# Patient Record
Sex: MALE | Race: WHITE | HISPANIC OR LATINO | URBAN - METROPOLITAN AREA
[De-identification: names, ages, dates, MRNs, and addresses within clinical notes are randomized per-mention and may not be internally consistent; named-entity substitution may affect disease eponyms.]

---

## 2017-06-23 ENCOUNTER — TRANSFERRED RECORDS (OUTPATIENT)
Dept: HEALTH INFORMATION MANAGEMENT | Facility: CLINIC | Age: 32
End: 2017-06-23

## 2018-06-06 DIAGNOSIS — C92.10 CML (CHRONIC MYELOCYTIC LEUKEMIA) (H): Primary | ICD-10-CM

## 2018-06-07 ENCOUNTER — APPOINTMENT (OUTPATIENT)
Dept: LAB | Facility: CLINIC | Age: 33
End: 2018-06-07
Attending: INTERNAL MEDICINE
Payer: COMMERCIAL

## 2018-06-07 ENCOUNTER — OFFICE VISIT (OUTPATIENT)
Dept: TRANSPLANT | Facility: CLINIC | Age: 33
End: 2018-06-07
Attending: INTERNAL MEDICINE
Payer: COMMERCIAL

## 2018-06-07 VITALS
TEMPERATURE: 98.3 F | RESPIRATION RATE: 16 BRPM | SYSTOLIC BLOOD PRESSURE: 130 MMHG | HEIGHT: 68 IN | WEIGHT: 175.2 LBS | BODY MASS INDEX: 26.55 KG/M2 | HEART RATE: 86 BPM | DIASTOLIC BLOOD PRESSURE: 79 MMHG | OXYGEN SATURATION: 96 %

## 2018-06-07 DIAGNOSIS — C92.10 CML (CHRONIC MYELOCYTIC LEUKEMIA) (H): ICD-10-CM

## 2018-06-07 DIAGNOSIS — C92.10 CML (CHRONIC MYELOCYTIC LEUKEMIA) (H): Primary | ICD-10-CM

## 2018-06-07 LAB
ALBUMIN SERPL-MCNC: 4.2 G/DL (ref 3.4–5)
ALP SERPL-CCNC: 76 U/L (ref 40–150)
ALT SERPL W P-5'-P-CCNC: 29 U/L (ref 0–70)
ANION GAP SERPL CALCULATED.3IONS-SCNC: 6 MMOL/L (ref 3–14)
AST SERPL W P-5'-P-CCNC: 26 U/L (ref 0–45)
BASOPHILS # BLD AUTO: 0 10E9/L (ref 0–0.2)
BASOPHILS NFR BLD AUTO: 0.3 %
BILIRUB SERPL-MCNC: 0.5 MG/DL (ref 0.2–1.3)
BUN SERPL-MCNC: 15 MG/DL (ref 7–30)
CALCIUM SERPL-MCNC: 8.6 MG/DL (ref 8.5–10.1)
CHLORIDE SERPL-SCNC: 105 MMOL/L (ref 94–109)
CO2 SERPL-SCNC: 28 MMOL/L (ref 20–32)
CREAT SERPL-MCNC: 0.9 MG/DL (ref 0.66–1.25)
DIFFERENTIAL METHOD BLD: ABNORMAL
EOSINOPHIL # BLD AUTO: 0 10E9/L (ref 0–0.7)
EOSINOPHIL NFR BLD AUTO: 0.7 %
ERYTHROCYTE [DISTWIDTH] IN BLOOD BY AUTOMATED COUNT: 12.4 % (ref 10–15)
GFR SERPL CREATININE-BSD FRML MDRD: >90 ML/MIN/1.7M2
GLUCOSE SERPL-MCNC: 98 MG/DL (ref 70–99)
HCT VFR BLD AUTO: 39.9 % (ref 40–53)
HGB BLD-MCNC: 14.1 G/DL (ref 13.3–17.7)
IMM GRANULOCYTES # BLD: 0 10E9/L (ref 0–0.4)
IMM GRANULOCYTES NFR BLD: 0.2 %
LYMPHOCYTES # BLD AUTO: 2.7 10E9/L (ref 0.8–5.3)
LYMPHOCYTES NFR BLD AUTO: 46.1 %
MCH RBC QN AUTO: 32.7 PG (ref 26.5–33)
MCHC RBC AUTO-ENTMCNC: 35.3 G/DL (ref 31.5–36.5)
MCV RBC AUTO: 93 FL (ref 78–100)
MONOCYTES # BLD AUTO: 0.5 10E9/L (ref 0–1.3)
MONOCYTES NFR BLD AUTO: 7.7 %
NEUTROPHILS # BLD AUTO: 2.7 10E9/L (ref 1.6–8.3)
NEUTROPHILS NFR BLD AUTO: 45 %
NRBC # BLD AUTO: 0 10*3/UL
NRBC BLD AUTO-RTO: 0 /100
PLATELET # BLD AUTO: 174 10E9/L (ref 150–450)
POTASSIUM SERPL-SCNC: 4.1 MMOL/L (ref 3.4–5.3)
PROT SERPL-MCNC: 7.1 G/DL (ref 6.8–8.8)
RBC # BLD AUTO: 4.31 10E12/L (ref 4.4–5.9)
SODIUM SERPL-SCNC: 138 MMOL/L (ref 133–144)
WBC # BLD AUTO: 5.9 10E9/L (ref 4–11)

## 2018-06-07 PROCEDURE — G0463 HOSPITAL OUTPT CLINIC VISIT: HCPCS

## 2018-06-07 PROCEDURE — 85025 COMPLETE CBC W/AUTO DIFF WBC: CPT | Performed by: INTERNAL MEDICINE

## 2018-06-07 PROCEDURE — 81207 BCR/ABL1 GENE MINOR BP: CPT | Performed by: INTERNAL MEDICINE

## 2018-06-07 PROCEDURE — 36415 COLL VENOUS BLD VENIPUNCTURE: CPT

## 2018-06-07 PROCEDURE — 80053 COMPREHEN METABOLIC PANEL: CPT | Performed by: INTERNAL MEDICINE

## 2018-06-07 PROCEDURE — 81206 BCR/ABL1 GENE MAJOR BP: CPT | Performed by: INTERNAL MEDICINE

## 2018-06-07 RX ORDER — IMATINIB MESYLATE 400 MG/1
400 TABLET, FILM COATED ORAL DAILY
Qty: 30 TABLET | Refills: 0 | Status: SHIPPED | OUTPATIENT
Start: 2018-06-07 | End: 2019-02-18

## 2018-06-07 ASSESSMENT — PAIN SCALES - GENERAL: PAINLEVEL: NO PAIN (0)

## 2018-06-07 NOTE — MR AVS SNAPSHOT
After Visit Summary   6/7/2018    Easton Pardo    MRN: [id number]           Patient Information     Date Of Birth          1985        Visit Information        Provider Department      6/7/2018 2:00 PM Sonia Bermudez MD [facility] Blood and Marrow Transplant        Today's Diagnoses     CML (chronic myelocytic leukemia) (H)              [facility] ([facility])  [address] [place] [address]  Phone: [phone]  Clinic Hours:   Monday-Thursday:7am to 7pm   Friday: 7am to 5pm   Weekends and holidays:    8am to noon (in general)  If your fever is 100.5  or greater,   call the clinic.  After hours call the   hospital at [phone] or   [phone]. Ask for the BMT   fellow on-call           Care Instructions    elizabeth onc in 6 months with labs prior          Follow-ups after your visit        Your next 10 appointments already scheduled     Dec 06, 2018  3:00 PM CST   [facility] Lab Draw with  [facility] DRAW   [facility] Lab Draw ([facility])    [address] [place] [address]  [address] [place] [id number] [phone]            Dec 06, 2018  3:30 PM CST   RETURN ONC with Sonia Bermudez MD   [facility] Blood and Marrow Transplant ([facility])    [address] [place] [address]  [address] [place] [id number] [phone]              Who to contact     If you have questions or need follow up information about today's clinic visit or your schedule please contact [facility] BLOOD AND MARROW TRANSPLANT directly at [phone].  Normal or non-critical lab and imaging results will be communicated to you by MyChart, letter or phone within 4 business days after the clinic has received the results. If you do not hear from us within 7 days, please contact the clinic through MyChart or phone. If you have a critical or abnormal lab result, we will notify you by phone as soon as possible.  Submit refill requests  "through [employer] or call your pharmacy and they will forward the refill request to us. Please allow 3 business days for your refill to be completed.          Additional Information About Your Visit        [employer] Information     [employer] lets you send messages to your doctor, view your test results, renew your prescriptions, schedule appointments and more. To sign up, go to [email or web address] . Click on \"Log in\" on the left side of the screen, which will take you to the Welcome page. Then click on \"Sign up Now\" on the right side of the page.     You will be asked to enter the access code listed below, as well as some personal information. Please follow the directions to create your username and password.     Your access code is: [email or web address]  Expires: 2018  1:21 PM     Your access code will  in 90 days. If you need help or a new code, please call your [place] clinic or [phone].        Care EveryWhere ID     This is your Care EveryWhere ID. This could be used by other organizations to access your [place] medical records  [id number]        Your Vitals Were     Pulse Temperature Respirations Height Pulse Oximetry BMI (Body Mass Index)    86 98.3  F (36.8  C) (Oral) 16 1.73 m (5' 8.11\") 96% 26.55 kg/m2       Blood Pressure from Last 3 Encounters:   18 130/79    Weight from Last 3 Encounters:   18 79.5 kg (175 lb 3.2 oz)              We Performed the Following     BCR ABL1 Major Breakpoint Quant p210     BCR ABL1 Minor Breakpoint Quant p190     CBC with platelets differential     Comprehensive metabolic panel        Recent Review Flowsheet Data     BMT Recent Results Latest Ref Rng & Units 2018    WBC 4.0 - 11.0 10e9/L 5.9    Hemoglobin 13.3 - 17.7 g/dL 14.1    Platelet Count 150 - 450 10e9/L 174    Neutrophils (Absolute) 1.6 - 8.3 10e9/L 2.7    Sodium 133 - 144 mmol/L 138    Potassium 3.4 - 5.3 mmol/L 4.1    Chloride 94 - 109 mmol/L 105    Glucose 70 - 99 mg/dL 98    Urea Nitrogen 7 " - 30 mg/dL 15    Creatinine 0.66 - 1.25 mg/dL 0.90    Calcium (Total) 8.5 - 10.1 mg/dL 8.6    Protein (Total) 6.8 - 8.8 g/dL 7.1    Albumin 3.4 - 5.0 g/dL 4.2    Alkaline Phosphatase 40 - 150 U/L 76    AST 0 - 45 U/L 26    ALT 0 - 70 U/L 29    MCV 78 - 100 fl 93               Primary Care Provider    None Specified       No primary provider on file.        Equal Access to Services     NICCI RODRIGUEZ : Hadii aad ku hadasho Soomaali, waaxda luqadaha, qaybta kaalmada adeegyada, waxzeeshan reed . So [facility] [phone].    ATENCIÓN: Si habla español, tiene a lopez disposición servicios gratuitos de asistencia lingüística. Llame al [phone].    We comply with applicable federal civil rights laws and [place] laws. We do not discriminate on the basis of race, color, national origin, age, disability, sex, sexual orientation, or gender identity.            Thank you!     Thank you for choosing [facility] BLOOD AND MARROW TRANSPLANT  for your care. Our goal is always to provide you with excellent care. Hearing back from our patients is one way we can continue to improve our services. Please take a few minutes to complete the written survey that you may receive in the mail after your visit with us. Thank you!             Your Updated Medication List - Protect others around you: Learn how to safely use, store and throw away your medicines at www.disposemymeds.org.          This list is accurate as of 6/7/18  2:59 PM.  Always use your most recent med list.                   Brand Name Dispense Instructions for use Diagnosis    GLEEVEC PO      Take 400 mg by mouth daily

## 2018-06-07 NOTE — NURSING NOTE
"Oncology Rooming Note    June 7, 2018 2:02 PM   Easton Pardo is a 32 year old male who presents for:    Chief Complaint   Patient presents with     Blood Draw     labs drawn by venipuncture by rn.  vs taken.     Oncology Clinic Visit     New: CML (chronic myelocytic leukemia)     Initial Vitals: /79 (BP Location: Right arm, Patient Position: Sitting, Cuff Size: Adult Regular)  Pulse 86  Temp 98.3  F (36.8  C) (Oral)  Resp 16  Ht 1.73 m (5' 8.11\")  Wt 79.5 kg (175 lb 3.2 oz)  SpO2 96%  BMI 26.55 kg/m2 Estimated body mass index is 26.55 kg/(m^2) as calculated from the following:    Height as of this encounter: 1.73 m (5' 8.11\").    Weight as of this encounter: 79.5 kg (175 lb 3.2 oz). Body surface area is 1.95 meters squared.  No Pain (0) Comment: Data Unavailable   No LMP for male patient.  Allergies reviewed: Yes  Medications reviewed: Yes    Medications: Medication refills not needed today.  Pharmacy name entered into EPIC: Data Unavailable    Clinical concerns: N/A     5 minutes for nursing intake (face to face time)     Mary Luther CMA              "

## 2018-06-07 NOTE — PROGRESS NOTES
"[facility] New Patient Visit  6/7/2018    Disease and Treatment History:  1. Presented in 10/2009 with abdominal fullness, profound fatigue, SOB and found to have splenomegaly, elevated WBC, anemia, and retained platelet count  2. 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 Imatinib started Fall 2009  -- CBC quickly normalized  -- Cytogenetic Remission obtained (unclear date)  -- Major molecular response as of 2011  -- Molecular Remission noted in 6/2012  4. Transfer of care to [facility] in 10/2012 and has been followed there until 6/2017. Waxing and waning p210 and p190 levels all < 0.006% on the IS. Last checked there on 6/2017:  -- P210 0.001% IS  -- P190: 0.0001%  5. Moved to [place] 6/2017    HPI: Easton comes in today to establish his CML care in [place]. He was in [facility] over the winter and got labs drawn there that were \"normal. \" He comes in today to get lab follow-up and officially transition his care here. He notes no fevers or chills, no recent infections, no GI issues, no MSK issues. Overall feels quite well and tolerates the imatinib quite well. Notes that he does miss some doses at times but is only a few times a month.     10 point ROS otherwise negative    Past Medical History:  1. CML per HPI  2. Hair Loss      Medications:  Imatinib 400 mg daily  Propecia      Social History: Moved to [place] in summer 2017 to be with his girlfriend. They live in the [facility] area. He teaches ESL on the internet. Also enjoys reading, writing, biking, hiking. Former smoker (although minimal). Former use of some recreational drugs (none IV). Intermittent marijuana. Nothing currently    Family History: Brother 5 years younger and healthy. No hematologic malignancies    Physical Exam:  /79 (BP Location: Right arm, Patient Position: Sitting, Cuff Size: Adult Regular)  Pulse 86  Temp 98.3  F (36.8  C) (Oral)  Resp 16  Ht 1.73 m (5' 8.11\")  Wt 79.5 " kg (175 lb 3.2 oz)  SpO2 96%  BMI 26.55 kg/m2  Gen: Well appearing.   HEENt: OP clear, no thrush, no GENESIS  Lungs: CTAB no crackles or wheezes  CV: RRR no r/m/g  Abd: soft, NT/ND, no HSM  Ext: no edema      Labs:  Results for MOHAN GARCIA (MRN [id number]) as of 6/7/2018 14:51   Ref. Range 6/7/2018 13:50   Sodium Latest Ref Range: 133 - 144 mmol/L 138   Potassium Latest Ref Range: 3.4 - 5.3 mmol/L 4.1   Chloride Latest Ref Range: 94 - 109 mmol/L 105   Carbon Dioxide Latest Ref Range: 20 - 32 mmol/L 28   Urea Nitrogen Latest Ref Range: 7 - 30 mg/dL 15   Creatinine Latest Ref Range: 0.66 - 1.25 mg/dL 0.90   GFR Estimate Latest Ref Range: >60 mL/min/1.7m2 >90   GFR Estimate If Black Latest Ref Range: >60 mL/min/1.7m2 >90   Calcium Latest Ref Range: 8.5 - 10.1 mg/dL 8.6   Anion Gap Latest Ref Range: 3 - 14 mmol/L 6   Albumin Latest Ref Range: 3.4 - 5.0 g/dL 4.2   Protein Total Latest Ref Range: 6.8 - 8.8 g/dL 7.1   Bilirubin Total Latest Ref Range: 0.2 - 1.3 mg/dL 0.5   Alkaline Phosphatase Latest Ref Range: 40 - 150 U/L 76   ALT Latest Ref Range: 0 - 70 U/L 29   AST Latest Ref Range: 0 - 45 U/L 26   Glucose Latest Ref Range: 70 - 99 mg/dL 98   WBC Latest Ref Range: 4.0 - 11.0 10e9/L 5.9   Hemoglobin Latest Ref Range: 13.3 - 17.7 g/dL 14.1   Hematocrit Latest Ref Range: 40.0 - 53.0 % 39.9 (L)   Platelet Count Latest Ref Range: 150 - 450 10e9/L 174   RBC Count Latest Ref Range: 4.4 - 5.9 10e12/L 4.31 (L)   MCV Latest Ref Range: 78 - 100 fl 93   MCH Latest Ref Range: 26.5 - 33.0 pg 32.7   MCHC Latest Ref Range: 31.5 - 36.5 g/dL 35.3   RDW Latest Ref Range: 10.0 - 15.0 % 12.4   Diff Method Unknown Automated Method   % Neutrophils Latest Units: % 45.0   % Lymphocytes Latest Units: % 46.1   % Monocytes Latest Units: % 7.7   % Eosinophils Latest Units: % 0.7   % Basophils Latest Units: % 0.3   % Immature Granulocytes Latest Units: % 0.2   Nucleated RBCs Latest Ref Range: 0 /100 0   Absolute Neutrophil Latest  Ref Range: 1.6 - 8.3 10e9/L 2.7   Absolute Lymphocytes Latest Ref Range: 0.8 - 5.3 10e9/L 2.7   Absolute Monocytes Latest Ref Range: 0.0 - 1.3 10e9/L 0.5   Absolute Eosinophils Latest Ref Range: 0.0 - 0.7 10e9/L 0.0   Absolute Basophils Latest Ref Range: 0.0 - 0.2 10e9/L 0.0   Abs Immature Granulocytes Latest Ref Range: 0 - 0.4 10e9/L 0.0   Absolute Nucleated RBC Unknown 0.0       BCR-ABL p210 and p190 pending    A/P: 31 yo man with CP CML since 2009 with molecular remission on imatinib    1. CML: Currently on imatinib 400 mg daily that he takes 200 mg BID due to nausea. BCR-ABL pending from here. If undetectable will plan for repeat testing every 6 months for now.     2. Heme: Good counts    3. ID: no infectious issues    4. Renal: normal renal function    5. GI: no liver issues. Mild nausea sometimes with the imatinib    Final Plan:  - Lara in 6 months with labs prior  - follow-up on bcr-abl    Sonia Bermudez      Addendum: BCR-ABL undetectable. Called patient and left message per prior permission with good results.    Sonia Bermudez

## 2018-06-07 NOTE — NURSING NOTE
Chief Complaint   Patient presents with     Blood Draw     labs drawn by venipuncture by rn.  vs taken.     Labs drawn by venipuncture by rn.  Vital signs taken.  Pt checked in to next appointment.  Geraldine Mitchell RN

## 2018-06-14 LAB
COPATH REPORT: NORMAL
COPATH REPORT: NORMAL

## 2018-06-15 DIAGNOSIS — C92.10 CML (CHRONIC MYELOCYTIC LEUKEMIA) (H): Primary | ICD-10-CM

## 2018-06-21 ENCOUNTER — TELEPHONE (OUTPATIENT)
Dept: ONCOLOGY | Facility: CLINIC | Age: 33
End: 2018-06-21

## 2018-06-21 ENCOUNTER — TELEPHONE (OUTPATIENT)
Dept: PHARMACY | Facility: CLINIC | Age: 33
End: 2018-06-21

## 2018-06-21 NOTE — ORAL ONC MGMT
"Oral Chemotherapy Monitoring Program    Primary Oncologist: Dr. Bermudez  Primary Oncology Clinic: [facility]  Cancer Diagnosis: CML    Drug: Gleevec 400mg tablets.  Previous Cycle Start Date: approximately 6/7/2018  Dose is appropriate for patient.  Expected duration of therapy: Until disease progression or unacceptable toxicity    Drug Interaction Assessment: No drug interactions found at this time. He takes no other medications.    Lab Monitoring Plan  CBC weekly for first month, then Q2 weeks for second month, then monthly  CMP monthly  Mag monthly  Phos monthly  Subjective/Objective:  Easton Pardo is a 32 year old male contacted by phone for an initial visit for oral chemotherapy education.  Easton has been on Gleevec for some time prior to transferring care to this clinic. Easton said that most days he takes a half tablet at around noon and the other half at around 5PM. He does this because unless he eats a large meal he said he has nausea. He denied any other side effects. He is not sure how much total water he drinks per but said he tries to drink a good amount each day. He would like to try the brand name Gleevec next time around because he feels the generic may be causing reduced alertness/cognition. He thanked me for the call and care.    ORAL CHEMOTHERAPY 6/21/2018   Drug Name Gleevec (Imatinib)   Current Schedule Daily   Cycle Details Continuous   Start Date of Last Cycle 6/7/2018   Adverse Effects Nausea   Pharmacist Intervention(nausea) Yes   Intervention(s) Referral to oncology provider   Any new drug interactions? No           Vitals:  BP:   BP Readings from Last 1 Encounters:   06/07/18 130/79     Wt Readings from Last 1 Encounters:   06/07/18 79.5 kg (175 lb 3.2 oz)     Estimated body surface area is 1.95 meters squared as calculated from the following:    Height as of 6/7/18: 1.73 m (5' 8.11\").    Weight as of 6/7/18: 79.5 kg (175 lb 3.2 oz).      Labs:  _  Result Component " Current Result Ref Range   Sodium 138 (6/7/2018) 133 - 144 mmol/L     _  Result Component Current Result Ref Range   Potassium 4.1 (6/7/2018) 3.4 - 5.3 mmol/L     _  Result Component Current Result Ref Range   Calcium 8.6 (6/7/2018) 8.5 - 10.1 mg/dL     No results found for Mag within last 30 days.     No results found for Phos within last 30 days.     _  Result Component Current Result Ref Range   Albumin 4.2 (6/7/2018) 3.4 - 5.0 g/dL     _  Result Component Current Result Ref Range   Urea Nitrogen 15 (6/7/2018) 7 - 30 mg/dL     _  Result Component Current Result Ref Range   Creatinine 0.90 (6/7/2018) 0.66 - 1.25 mg/dL       _  Result Component Current Result Ref Range   AST 26 (6/7/2018) 0 - 45 U/L     _  Result Component Current Result Ref Range   ALT 29 (6/7/2018) 0 - 70 U/L     _  Result Component Current Result Ref Range   Bilirubin Total 0.5 (6/7/2018) 0.2 - 1.3 mg/dL       _  Result Component Current Result Ref Range   WBC 5.9 (6/7/2018) 4.0 - 11.0 10e9/L     _  Result Component Current Result Ref Range   Hemoglobin 14.1 (6/7/2018) 13.3 - 17.7 g/dL     _  Result Component Current Result Ref Range   Platelet Count 174 (6/7/2018) 150 - 450 10e9/L     _  Result Component Current Result Ref Range   Absolute Neutrophil 2.7 (6/7/2018) 1.6 - 8.3 10e9/L       Assessment:  It would be best not to split tablets.     Plan:  Easton will work to insure he gets at least 64 ounces of water per day. Will consult with Dr. Bermudez regarding the dosing schedule and product selection for the next refill.      Follow-Up:  Pending input from Dr. Bermudez.     Adal SalgueroD  [facility]  [phone]  June 21, 2018

## 2018-06-21 NOTE — TELEPHONE ENCOUNTER
Oral Chemotherapy Monitoring Program     Placed call to patient in follow up of Imatinib therapy.     Left message to please call back in follow-up of therapy    No patient or drug names were mentioned.     Walt Hooks, PharmD, BCOP  June 21, 2018  [facility] oral Oncology

## 2018-06-22 DIAGNOSIS — C92.10 CML (CHRONIC MYELOCYTIC LEUKEMIA) (H): ICD-10-CM

## 2018-06-22 PROCEDURE — 81206 BCR/ABL1 GENE MAJOR BP: CPT | Performed by: INTERNAL MEDICINE

## 2018-07-05 DIAGNOSIS — C92.10 CML (CHRONIC MYELOCYTIC LEUKEMIA) (H): Primary | ICD-10-CM

## 2018-07-05 RX ORDER — IMATINIB MESYLATE 100 MG/1
400 TABLET, FILM COATED ORAL DAILY
Qty: 120 TABLET | Refills: 0 | Status: SHIPPED | OUTPATIENT
Start: 2018-07-05 | End: 2019-02-18

## 2018-07-05 NOTE — ORAL ONC MGMT
Called Easton to alert him that I refilled his Gleevec today with the 100 mg strength tablets and that he should take 2 tablets (200 mg) in the morning and 2 tablets (200 mg) in the evening.  Patient had been splitting his 400 mg tablets and taking 1/2 tablet (200 mg) in the morning and 1/2 tablet (200 mg) in the evening due to nausea if takes the full 400 mg at once.  Due to the fact that chemotherapy should not be split we are changing to the 100 mg tablets.  Patient understood all of this and was very happy to hear we were doing this.    Halima Gramajo, Pharm.D., [facility]  [phone]  07/05/18

## 2018-07-09 LAB — COPATH REPORT: NORMAL

## 2018-07-20 ENCOUNTER — TELEPHONE (OUTPATIENT)
Dept: ONCOLOGY | Facility: CLINIC | Age: 33
End: 2018-07-20

## 2018-07-20 NOTE — TELEPHONE ENCOUNTER
Prior Authorization Approval    Date Range of Authorization: 07/19/2018-07/19/2019  Medication:  Gleevec  Approved Dose/Quantity: 400mg, 120/30 days  Diagnosis: CML  Reference #: [id number]  Insurance Company: [employer]  Insurance I/D #: [id number]  Expected CoPay: $ 3.00  CoPay Card Available: N/A  Foundation Assistance Needed: N/A  Which Pharmacy is filling the prescription (Not needed for infusion/clinic administered): [facility]    If you received a fax notification from an outside pharmacy;  Pharmacy Name: N/A  Pharmacy #: N/A  Pharmacy Fax: N/A

## 2018-07-30 DIAGNOSIS — C92.10 CML (CHRONIC MYELOCYTIC LEUKEMIA) (H): Primary | ICD-10-CM

## 2018-07-30 RX ORDER — IMATINIB MESYLATE 100 MG/1
400 TABLET, FILM COATED ORAL DAILY
Qty: 120 TABLET | Refills: 0 | Status: SHIPPED | OUTPATIENT
Start: 2018-07-30 | End: 2019-02-18

## 2018-08-07 ENCOUNTER — TELEPHONE (OUTPATIENT)
Dept: ONCOLOGY | Facility: CLINIC | Age: 33
End: 2018-08-07

## 2018-08-07 DIAGNOSIS — C92.10 CML (CHRONIC MYELOCYTIC LEUKEMIA) (H): Primary | ICD-10-CM

## 2018-08-07 RX ORDER — IMATINIB MESYLATE 100 MG/1
400 TABLET, FILM COATED ORAL DAILY
Qty: 120 TABLET | Refills: 2 | Status: SHIPPED | OUTPATIENT
Start: 2018-08-07 | End: 2019-02-18

## 2018-08-07 NOTE — ORAL ONC MGMT
Released gleevec for a travel fill.    Sami Chavez, Pharm D.  Clinical Oncology Pharmacist- Oral Chemotherapy Monitoring Program  [phone]    August 7, 2018

## 2018-08-07 NOTE — ORAL ONC MGMT
Oral Chemotherapy Monitoring Program    Primary Oncologist: Dr. Bermudez  Primary Oncology Clinic: [facility]  Cancer Diagnosis: CML     Drug: Gleevec 400mg tablets.  Previous Cycle Start Date: approximately 6/7/2018  Dose is appropriate for patient.  Expected duration of therapy: Until disease progression or unacceptable toxicity     Drug Interaction Assessment: No drug interactions found at this time. He takes no other medications.     Lab Monitoring Plan  CBC weekly for first month, then Q2 weeks for second month, then monthly  CMP monthly  Mag monthly  Phos monthlySubjective/Objective:  Easton Pardo is a 32 year old male contacted by phone for a follow-up visit for oral chemotherapy.  Pt reports that he is feeling well. He is taking Gleevec 200mg ([id number]) twice daily and denies any nausea. He denies any missed doses. He is traveling to [facility] from 8/24-9/14 and is requesting to refill his Gleevec before leaving. Writer had [facility] liaison run a test claim and patient can fill on 8/21 without needing an insurance override. Writer requested new rx to be sent over to [facility] so we can fill on 8/21 and deliver on 8/22.    ORAL CHEMOTHERAPY 6/21/2018 8/7/2018   Drug Name Gleevec (Imatinib) Gleevec (Imatinib)   Current Dosage - 200mg   Current Schedule Daily BID   Cycle Details Continuous Continuous   Start Date of Last Cycle 6/7/2018 -   Doses missed in last 2 weeks - 0   Adherence Assessment - Adherent   Adverse Effects Nausea No AE identified during assessment   Pharmacist Intervention(nausea) Yes -   Intervention(s) Referral to oncology provider -   Any new drug interactions? No -       Last PHQ-2 Score on record: No flowsheet data found.    Patient does not report depression symptoms.      Vitals:  BP:   BP Readings from Last 1 Encounters:   06/07/18 130/79     Wt Readings from Last 1 Encounters:   06/07/18 79.5 kg (175 lb 3.2 oz)     Estimated body surface area is 1.95 meters squared as calculated  "from the following:    Height as of 6/7/18: 1.73 m (5' 8.11\").    Weight as of 6/7/18: 79.5 kg (175 lb 3.2 oz).    Labs:  No results found for NA within last 30 days.     No results found for K within last 30 days.     No results found for CA within last 30 days.     No results found for Mag within last 30 days.     No results found for Phos within last 30 days.     No results found for ALBUMIN within last 30 days.     No results found for BUN within last 30 days.     No results found for CR within last 30 days.       No results found for AST within last 30 days.     No results found for ALT within last 30 days.     No results found for BILITOTAL within last 30 days.       No results found for WBC within last 30 days.     No results found for HGB within last 30 days.     No results found for PLT within last 30 days.     No results found for ANC within last 30 days.               Assessment:  Pt is tolerating current therapy well.     Plan:  Continue with current therapy.     Follow-Up:  2 weeks to ensure that rx is filled on 8/21    Refill Due:  Pt needs by 8/23 d/t traveling out of [facility] from 8/24-9/14.    Irene Bowman RN  [facility]-Therapy Management  [phone]      "

## 2018-11-15 DIAGNOSIS — C92.10 CML (CHRONIC MYELOCYTIC LEUKEMIA) (H): Primary | ICD-10-CM

## 2018-11-15 RX ORDER — IMATINIB MESYLATE 100 MG/1
400 TABLET, FILM COATED ORAL DAILY
Qty: 120 TABLET | Refills: 2 | Status: SHIPPED | OUTPATIENT
Start: 2018-11-15 | End: 2019-02-18

## 2018-11-16 ENCOUNTER — TELEPHONE (OUTPATIENT)
Dept: ONCOLOGY | Facility: CLINIC | Age: 33
End: 2018-11-16

## 2018-11-16 NOTE — TELEPHONE ENCOUNTER
"Oral Chemotherapy Monitoring Program  Primary Oncologist: Dr. Bermudez  Primary Oncology Clinic: [facility]  Cancer Diagnosis: CML      Drug: Gleevec 400mg tablets.  Previous Cycle Start Date: approximately 6/7/2018  Dose is appropriate for patient.  Expected duration of therapy: Until disease progression or unacceptable toxicity      Drug Interaction Assessment: No drug interactions found at this time. He takes no other medications.      Lab Monitoring Plan  CBC weekly for first month, then Q2 weeks for second month, then monthly  CMP monthly  Mag monthly  Phos monthly    Subjective/Objective:  Easton Pardo is a 33 year old male contacted by phone for a follow-up visit for oral chemotherapy.  Easton reports that things continue to go well with his Gleevec. He is dosing 400mg (4 x 100mg) daily. He denies any side effects, missed doses or concerns. He has approximately 7 day supply. Writer emailed [facility] to deliver on 11/21.     ORAL CHEMOTHERAPY 6/21/2018 8/7/2018 11/16/2018   Drug Name Gleevec (Imatinib) Gleevec (Imatinib) Gleevec (Imatinib)   Current Dosage - 200mg 400mg   Current Schedule Daily BID Daily   Cycle Details Continuous Continuous Continuous   Start Date of Last Cycle 6/7/2018 - -   Doses missed in last 2 weeks - 0 0   Adherence Assessment - Adherent Adherent   Adverse Effects Nausea No AE identified during assessment No AE identified during assessment   Pharmacist Intervention(nausea) Yes - -   Intervention(s) Referral to oncology provider - -   Any new drug interactions? No - -       Last PHQ-2 Score on record: No flowsheet data found.    Patient does not report depression symptoms.      Vitals:  BP:   BP Readings from Last 1 Encounters:   06/07/18 130/79     Wt Readings from Last 1 Encounters:   06/07/18 79.5 kg (175 lb 3.2 oz)     Estimated body surface area is 1.95 meters squared as calculated from the following:    Height as of 6/7/18: 1.73 m (5' 8.11\").    Weight as of 6/7/18: " 79.5 kg (175 lb 3.2 oz).    Labs:  No results found for NA within last 30 days.     No results found for K within last 30 days.     No results found for CA within last 30 days.     No results found for Mag within last 30 days.     No results found for Phos within last 30 days.     No results found for ALBUMIN within last 30 days.     No results found for BUN within last 30 days.     No results found for CR within last 30 days.       No results found for AST within last 30 days.     No results found for ALT within last 30 days.     No results found for BILITOTAL within last 30 days.       No results found for WBC within last 30 days.     No results found for HGB within last 30 days.     No results found for PLT within last 30 days.     No results found for ANC within last 30 days.               Assessment:  Pt is tolerating current therapy well.     Plan:  Continue with current therapy.     Follow-Up:  3 months for TM clinician assessment. 1 month for TM liaison refill call.    Refill Due:  To be delivered on 11/21.    Irene Bowman RN  [facility]-Therapy Management  [phone]

## 2018-12-05 DIAGNOSIS — C92.10 CML (CHRONIC MYELOCYTIC LEUKEMIA) (H): Primary | ICD-10-CM

## 2018-12-06 ENCOUNTER — APPOINTMENT (OUTPATIENT)
Dept: LAB | Facility: CLINIC | Age: 33
End: 2018-12-06
Attending: INTERNAL MEDICINE
Payer: COMMERCIAL

## 2018-12-06 ENCOUNTER — OFFICE VISIT (OUTPATIENT)
Dept: TRANSPLANT | Facility: CLINIC | Age: 33
End: 2018-12-06
Attending: INTERNAL MEDICINE
Payer: COMMERCIAL

## 2018-12-06 VITALS
SYSTOLIC BLOOD PRESSURE: 122 MMHG | BODY MASS INDEX: 28.96 KG/M2 | HEIGHT: 68 IN | WEIGHT: 191.1 LBS | HEART RATE: 87 BPM | OXYGEN SATURATION: 100 % | RESPIRATION RATE: 16 BRPM | TEMPERATURE: 98.2 F | DIASTOLIC BLOOD PRESSURE: 77 MMHG

## 2018-12-06 DIAGNOSIS — C92.10 CML (CHRONIC MYELOCYTIC LEUKEMIA) (H): ICD-10-CM

## 2018-12-06 LAB
ALBUMIN SERPL-MCNC: 4.2 G/DL (ref 3.4–5)
ALP SERPL-CCNC: 75 U/L (ref 40–150)
ALT SERPL W P-5'-P-CCNC: 36 U/L (ref 0–70)
ANION GAP SERPL CALCULATED.3IONS-SCNC: 6 MMOL/L (ref 3–14)
AST SERPL W P-5'-P-CCNC: 26 U/L (ref 0–45)
BASOPHILS # BLD AUTO: 0 10E9/L (ref 0–0.2)
BASOPHILS NFR BLD AUTO: 0.3 %
BILIRUB SERPL-MCNC: 0.3 MG/DL (ref 0.2–1.3)
BUN SERPL-MCNC: 12 MG/DL (ref 7–30)
CALCIUM SERPL-MCNC: 8.6 MG/DL (ref 8.5–10.1)
CHLORIDE SERPL-SCNC: 106 MMOL/L (ref 94–109)
CO2 SERPL-SCNC: 27 MMOL/L (ref 20–32)
CREAT SERPL-MCNC: 0.88 MG/DL (ref 0.66–1.25)
DIFFERENTIAL METHOD BLD: NORMAL
EOSINOPHIL # BLD AUTO: 0.1 10E9/L (ref 0–0.7)
EOSINOPHIL NFR BLD AUTO: 1.1 %
ERYTHROCYTE [DISTWIDTH] IN BLOOD BY AUTOMATED COUNT: 12.9 % (ref 10–15)
GFR SERPL CREATININE-BSD FRML MDRD: >90 ML/MIN/1.7M2
GLUCOSE SERPL-MCNC: 90 MG/DL (ref 70–99)
HCT VFR BLD AUTO: 40.7 % (ref 40–53)
HGB BLD-MCNC: 14.4 G/DL (ref 13.3–17.7)
IMM GRANULOCYTES # BLD: 0 10E9/L (ref 0–0.4)
IMM GRANULOCYTES NFR BLD: 0.3 %
LYMPHOCYTES # BLD AUTO: 2.9 10E9/L (ref 0.8–5.3)
LYMPHOCYTES NFR BLD AUTO: 45 %
MCH RBC QN AUTO: 32.2 PG (ref 26.5–33)
MCHC RBC AUTO-ENTMCNC: 35.4 G/DL (ref 31.5–36.5)
MCV RBC AUTO: 91 FL (ref 78–100)
MONOCYTES # BLD AUTO: 0.6 10E9/L (ref 0–1.3)
MONOCYTES NFR BLD AUTO: 9.3 %
NEUTROPHILS # BLD AUTO: 2.9 10E9/L (ref 1.6–8.3)
NEUTROPHILS NFR BLD AUTO: 44 %
NRBC # BLD AUTO: 0 10*3/UL
NRBC BLD AUTO-RTO: 0 /100
PLATELET # BLD AUTO: 199 10E9/L (ref 150–450)
POTASSIUM SERPL-SCNC: 4.3 MMOL/L (ref 3.4–5.3)
PROT SERPL-MCNC: 7.4 G/DL (ref 6.8–8.8)
RBC # BLD AUTO: 4.47 10E12/L (ref 4.4–5.9)
SODIUM SERPL-SCNC: 139 MMOL/L (ref 133–144)
WBC # BLD AUTO: 6.5 10E9/L (ref 4–11)

## 2018-12-06 PROCEDURE — 25000128 H RX IP 250 OP 636: Mod: ZF | Performed by: INTERNAL MEDICINE

## 2018-12-06 PROCEDURE — 85025 COMPLETE CBC W/AUTO DIFF WBC: CPT | Performed by: INTERNAL MEDICINE

## 2018-12-06 PROCEDURE — 36415 COLL VENOUS BLD VENIPUNCTURE: CPT

## 2018-12-06 PROCEDURE — G0463 HOSPITAL OUTPT CLINIC VISIT: HCPCS | Mod: ZF

## 2018-12-06 PROCEDURE — 80053 COMPREHEN METABOLIC PANEL: CPT | Performed by: INTERNAL MEDICINE

## 2018-12-06 PROCEDURE — 90686 IIV4 VACC NO PRSV 0.5 ML IM: CPT | Mod: ZF | Performed by: INTERNAL MEDICINE

## 2018-12-06 PROCEDURE — G0008 ADMIN INFLUENZA VIRUS VAC: HCPCS

## 2018-12-06 PROCEDURE — 81206 BCR/ABL1 GENE MAJOR BP: CPT | Performed by: INTERNAL MEDICINE

## 2018-12-06 PROCEDURE — G0463 HOSPITAL OUTPT CLINIC VISIT: HCPCS | Mod: 25

## 2018-12-06 RX ADMIN — INFLUENZA A VIRUS A/MICHIGAN/45/2015 X-275 (H1N1) ANTIGEN (FORMALDEHYDE INACTIVATED), INFLUENZA A VIRUS A/SINGAPORE/INFIMH-16-0019/2016 IVR-186 (H3N2) ANTIGEN (FORMALDEHYDE INACTIVATED), INFLUENZA B VIRUS B/PHUKET/3073/2013 ANTIGEN (FORMALDEHYDE INACTIVATED), AND INFLUENZA B VIRUS B/MARYLAND/15/2016 BX-69A ANTIGEN (FORMALDEHYDE INACTIVATED) 0.5 ML: 15; 15; 15; 15 INJECTION, SUSPENSION INTRAMUSCULAR at 16:12

## 2018-12-06 ASSESSMENT — PAIN SCALES - GENERAL: PAINLEVEL: NO PAIN (0)

## 2018-12-06 NOTE — NURSING NOTE
"Oncology Rooming Note    December 6, 2018 3:45 PM   Easton Pardo is a 33 year old male who presents for:    Chief Complaint   Patient presents with     Blood Draw     labs drawn via VPT by RN in lab. VS taken. Pt checked in for next appt     Oncology Clinic Visit     Return: CML (chronic myelocytic leukemia)      Initial Vitals: /77 (BP Location: Left arm, Patient Position: Sitting, Cuff Size: Adult Regular)  Pulse 87  Temp 98.2  F (36.8  C) (Oral)  Resp 16  Ht 1.73 m (5' 8.11\")  Wt 86.7 kg (191 lb 1.6 oz)  SpO2 100%  BMI 28.96 kg/m2 Estimated body mass index is 28.96 kg/(m^2) as calculated from the following:    Height as of this encounter: 1.73 m (5' 8.11\").    Weight as of this encounter: 86.7 kg (191 lb 1.6 oz). Body surface area is 2.04 meters squared.  No Pain (0) Comment: Data Unavailable   No LMP for male patient.  Allergies reviewed: Yes  Medications reviewed: Yes    Medications: Medication refills not needed today.  Pharmacy name entered into EPIC: Data Unavailable    Clinical concerns: N/A   5 minutes for nursing intake (face to face time)     Mary Luther CMA              "

## 2018-12-06 NOTE — NURSING NOTE
Chief Complaint   Patient presents with     Blood Draw     labs drawn via VPT by RN in lab. VS taken. Pt checked in for next appt     Labs collected from venipuncture by RN. Vitals taken. Checked in for appointment(s).    Florence WASHINGTON RN PHN BSN  BMT/Oncology Lab

## 2018-12-06 NOTE — NURSING NOTE
"Injectable Influenza Immunization Documentation    1.  Has the patient received the information for the injectable influenza vaccine? YES     2. Is the patient 6 months of age or older? YES     3. Does the patient have any of the following contraindications?         Severe allergy to eggs? No     Severe allergic reaction to previous influenza vaccines? No   Severe allergy to latex? No       History of Guillain-[place] syndrome? No     Currently have a temperature greater than 100.4F? No        4.  Severely egg allergic patients should have flu vaccine eligibility assessed by an MD, RN, or pharmacist, and those who received flu vaccine should be observed for 15 min by an MD, RN, Pharmacist, Medical Technician, or member of clinic staff.\": YES    5. Latex-allergic patients should be given latex-free influenza vaccine N/A. Please reference the Vaccine latex table to determine if your clinic s product is latex-containing.       Vaccination given by Laura Tellez MA          "

## 2018-12-06 NOTE — PROGRESS NOTES
"[facility] New Patient Visit  6/7/2018    Disease and Treatment History:  1. Presented in 10/2009 with abdominal fullness, profound fatigue, SOB and found to have splenomegaly, elevated WBC, anemia, and retained platelet count  2. 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 Imatinib started Fall 2009  -- CBC quickly normalized  -- Cytogenetic Remission obtained (unclear date)  -- Major molecular response as of 2011  -- Molecular Remission noted in 6/2012  4. Transfer of care to [facility] in 10/2012 and has been followed there until 6/2017. Waxing and waning p210 and p190 levels all < 0.006% on the IS. Last checked there on 6/2017:  -- P210 0.001% IS  -- P190: 0.0001%  5. Moved to [place] 6/2017  -     HPI: Mohan comes in today for CML follow-up. Doing well since I last saw him. No recent infection. No fevers or chills, no chest pain or SOB, no night sweats. Tolerating the imatinib with mild nausea. Not missing doses. No early satiety. Good appetite and energy. No bleeding of bruising.    10 point ROS otherwise negative    Physical Exam:  /77 (BP Location: Left arm, Patient Position: Sitting, Cuff Size: Adult Regular)  Pulse 87  Temp 98.2  F (36.8  C) (Oral)  Resp 16  Ht 1.73 m (5' 8.11\")  Wt 86.7 kg (191 lb 1.6 oz)  SpO2 100%  BMI 28.96 kg/m2  Gen: Well appearing.   HEENt: OP clear, no thrush, no GENESIS  Lungs: CTAB no crackles or wheezes  CV: RRR no r/m/g  Abd: soft, NT/ND, no HSM  Ext: no edema      Labs:  Results for MOHAN GARCIA (MRN [id number]) as of 12/6/2018 16:10   Ref. Range 12/6/2018 15:28   Sodium Latest Ref Range: 133 - 144 mmol/L 139   Potassium Latest Ref Range: 3.4 - 5.3 mmol/L 4.3   Chloride Latest Ref Range: 94 - 109 mmol/L 106   Carbon Dioxide Latest Ref Range: 20 - 32 mmol/L 27   Urea Nitrogen Latest Ref Range: 7 - 30 mg/dL 12   Creatinine Latest Ref Range: 0.66 - 1.25 mg/dL 0.88   GFR Estimate Latest Ref Range: >60 " mL/min/1.7m2 >90   GFR Estimate If Black Latest Ref Range: >60 mL/min/1.7m2 >90   Calcium Latest Ref Range: 8.5 - 10.1 mg/dL 8.6   Anion Gap Latest Ref Range: 3 - 14 mmol/L 6   Albumin Latest Ref Range: 3.4 - 5.0 g/dL 4.2   Protein Total Latest Ref Range: 6.8 - 8.8 g/dL 7.4   Bilirubin Total Latest Ref Range: 0.2 - 1.3 mg/dL 0.3   Alkaline Phosphatase Latest Ref Range: 40 - 150 U/L 75   ALT Latest Ref Range: 0 - 70 U/L 36   AST Latest Ref Range: 0 - 45 U/L 26   Glucose Latest Ref Range: 70 - 99 mg/dL 90   WBC Latest Ref Range: 4.0 - 11.0 10e9/L 6.5   Hemoglobin Latest Ref Range: 13.3 - 17.7 g/dL 14.4   Hematocrit Latest Ref Range: 40.0 - 53.0 % 40.7   Platelet Count Latest Ref Range: 150 - 450 10e9/L 199   RBC Count Latest Ref Range: 4.4 - 5.9 10e12/L 4.47   MCV Latest Ref Range: 78 - 100 fl 91   MCH Latest Ref Range: 26.5 - 33.0 pg 32.2   MCHC Latest Ref Range: 31.5 - 36.5 g/dL 35.4   RDW Latest Ref Range: 10.0 - 15.0 % 12.9   Diff Method Unknown Automated Method   % Neutrophils Latest Units: % 44.0   % Lymphocytes Latest Units: % 45.0   % Monocytes Latest Units: % 9.3   % Eosinophils Latest Units: % 1.1   % Basophils Latest Units: % 0.3   % Immature Granulocytes Latest Units: % 0.3   Nucleated RBCs Latest Ref Range: 0 /100 0   Absolute Neutrophil Latest Ref Range: 1.6 - 8.3 10e9/L 2.9   Absolute Lymphocytes Latest Ref Range: 0.8 - 5.3 10e9/L 2.9   Absolute Monocytes Latest Ref Range: 0.0 - 1.3 10e9/L 0.6   Absolute Eosinophils Latest Ref Range: 0.0 - 0.7 10e9/L 0.1   Absolute Basophils Latest Ref Range: 0.0 - 0.2 10e9/L 0.0   Abs Immature Granulocytes Latest Ref Range: 0 - 0.4 10e9/L 0.0   Absolute Nucleated RBC Unknown 0.0     BCR-ABL P210 pending    Last p190 checked 6/2018 undetectable      A/P: 32 yo man with CP CML since 2009 with molecular remission on imatinib    1. CML: Currently on imatinib 400 mg daily that he takes 200 mg BID due to nausea. BCR-ABL pending from today.     2. Heme: Good counts    3. ID:  no infectious issues  - flu shot today    4. Renal: normal renal function    5. GI: no liver issues. Mild nausea sometimes with the imatinib    Final Plan:  - follow-up BCR-ABL  - Lara 6 months if remains in CMR      Sonia Bermudez      Addendum: 12/10/2018:  This sample is positive for BCR-ABL1 transcripts of the major (p210)   breakpoint cluster regions with a   BCR-ABL1/ABL1 ratio of <0.028%.     This shows some evidence of activity but could be due to the more sensitive assay that is being used. Will plan to bring him back in 3 months instead of 6 for closer follow up.    Sonia Bermudez

## 2018-12-06 NOTE — MR AVS SNAPSHOT
After Visit Summary   12/6/2018    Easton Pardo    MRN: [id number]           Patient Information     Date Of Birth          1985        Visit Information        Provider Department      12/6/2018 3:45 PM Sonia Bermudez MD [facility] Blood and Marrow Transplant        Today's Diagnoses     CML (chronic myelocytic leukemia) (H)              [facility]  [address] [place] [id number]  Phone: [phone]  Clinic Hours:   Monday-Thursday:7am to 7pm   Friday: 7am to 5pm   Weekends and holidays:    8am to noon (in general)  If your fever is 100.5  or greater,   call the clinic.  After hours call the   hospital at [phone] or   [phone]. Ask for the BMT   fellow on-call           Care Instructions    [facility] oncology in 6 months with labs prior          Follow-ups after your visit        Future tests that were ordered for you today     Open Future Orders        Priority Expected Expires Ordered    Comprehensive metabolic panel Routine 6/6/2019 9/6/2019 12/6/2018    CBC with platelets differential Routine 6/6/2019 9/6/2019 12/6/2018    BCR ABL1 Major Breakpoint Quant p210 Routine 6/6/2019 9/6/2019 12/6/2018    BCR ABL1 Minor Breakpoint Quant p190 Routine 6/6/2019 9/6/2019 12/6/2018            Who to contact     If you have questions or need follow up information about today's clinic visit or your schedule please contact [facility] BLOOD [facility] MARROW TRANSPL[facility] directly at [phone].  Normal or non-critical lab and imaging results will be communicated to you by MyChart, letter or phone within 4 business days after the clinic has received the results. If you do not hear from us within 7 days, please contact the clinic through [employer] or phone. If you have a critical or abnormal lab result, we will notify you by phone as soon as possible.  Submit refill requests through [employer] or call your pharmacy and they will forward the refill request to us.  "Please allow 3 business days for your refill to be completed.          Additional Information About Your Visit        [employer] Information     [employer] gives you secure access to your electronic health record. If you see a primary care provider, you can also send messages to your care team and make appointments. If you have questions, please call your primary care clinic.  If you do not have a primary care provider, please call [phone] and they will assist you.        Care EveryWhere ID     This is your Care EveryWhere ID. This could be used by other organizations to access your [place] medical records  [id number]        Your Vitals Were     Pulse Temperature Respirations Height Pulse Oximetry BMI (Body Mass Index)    87 98.2  F (36.8  C) (Oral) 16 1.73 m (5' 8.11\") 100% 28.96 kg/m2       Blood Pressure from Last 3 Encounters:   12/06/18 122/77   06/07/18 130/79    Weight from Last 3 Encounters:   12/06/18 86.7 kg (191 lb 1.6 oz)   06/07/18 79.5 kg (175 lb 3.2 oz)              We Performed the Following     BCR ABL1 Major Breakpoint Quant p210     CBC with platelets differential     Comprehensive metabolic panel        Recent Review Flowsheet Data     BMT Recent Results Latest Ref Rng & Units 6/7/2018 12/6/2018    WBC 4.0 - 11.0 10e9/L 5.9 6.5    Hemoglobin 13.3 - 17.7 g/dL 14.1 14.4    Platelet Count 150 - 450 10e9/L 174 199    Neutrophils (Absolute) 1.6 - 8.3 10e9/L 2.7 2.9    Sodium 133 - 144 mmol/L 138 139    Potassium 3.4 - 5.3 mmol/L 4.1 4.3    Chloride 94 - 109 mmol/L 105 106    Glucose 70 - 99 mg/dL 98 90    Urea Nitrogen 7 - 30 mg/dL 15 12    Creatinine 0.66 - 1.25 mg/dL 0.90 0.88    Calcium (Total) 8.5 - 10.1 mg/dL 8.6 8.6    Protein (Total) 6.8 - 8.8 g/dL 7.1 7.4    Albumin 3.4 - 5.0 g/dL 4.2 4.2    Alkaline Phosphatase 40 - 150 U/L 76 75    AST 0 - 45 U/L 26 26    ALT 0 - 70 U/L 29 36    MCV 78 - 100 fl 93 91               Primary Care Provider Fax #    Physician No Ref-Primary [phone]       " No address on file        Equal Access to Services     [facility] : Hadii aad ku hadwinter Thibodeauxali, wasuzetteda luqsophiaha, qaybta kazenaidaalisson lama, terrell weaverdomingoamrta borjas. So [facility] [place] [phone].    ATENCIÓN: Si habla español, tiene a lopez disposición servicios gratuitos de asistencia lingüística. Nilamame al [phone].    We comply with applicable federal civil rights laws and [place] laws. We do not discriminate on the basis of race, color, national origin, age, disability, sex, sexual orientation, or gender identity.            Thank you!     Thank you for choosing [facility] BLOOD [facility] MARROW TRANSPL[facility]  for your care. Our goal is always to provide you with excellent care. Hearing back from our patients is one way we can continue to improve our services. Please take a few minutes to complete the written survey that you may receive in the mail after your visit with us. Thank you!             Your Updated Medication List - Protect others around you: Learn how to safely use, store and throw away your medicines at www.disposemymeds.org.          This list is accurate as of 12/6/18  4:13 PM.  Always use your most recent med list.                   Brand Name Dispense Instructions for use Diagnosis    * imatinib 100 MG tablet    GLEEVEC    120 tablet    Take 4 tablets (400 mg) by mouth daily Take with a meal and a large glass of water.    CML (chronic myelocytic leukemia) (H)       * imatinib 100 MG tablet    GLEEVEC    120 tablet    Take 4 tablets (400 mg) by mouth daily Take with a meal and a large glass of water.    CML (chronic myelocytic leukemia) (H)       * imatinib 100 MG tablet    GLEEVEC    120 tablet    Take 4 tablets (400 mg) by mouth daily Take with a meal and a large glass of water.    CML (chronic myelocytic leukemia) (H)       * imatinib 100 MG tablet    GLEEVEC    120 tablet    Take 4 tablets (400 mg) by mouth daily Take with a meal and a large glass of water.    CML (chronic myelocytic  leukemia) (H)       * Notice:  This list has 4 medication(s) that are the same as other medications prescribed for you. Read the directions carefully, and ask your doctor or other care provider to review them with you.

## 2018-12-10 LAB — COPATH REPORT: NORMAL

## 2018-12-17 ENCOUNTER — TELEPHONE (OUTPATIENT)
Dept: ONCOLOGY | Facility: CLINIC | Age: 33
End: 2018-12-17

## 2018-12-17 NOTE — TELEPHONE ENCOUNTER
Oral Chemotherapy Monitoring Program   Placed call to patient in follow up of Gleevec (imatinib) therapy.   Left message requesting call back.   No drug names were mentioned.    Irene Bowman RN  [facility]-Therapy Management  [phone]

## 2018-12-18 NOTE — TELEPHONE ENCOUNTER
Oral Chemotherapy Monitoring Program    Primary Oncologist: Dr. Bermudez  Primary Oncology Clinic: [facility]  Cancer Diagnosis: CML      Drug: Gleevec 400mg tablets.  Previous Cycle Start Date: approximately 6/7/2018  Dose is appropriate for patient.  Expected duration of therapy: Until disease progression or unacceptable toxicity      Drug Interaction Assessment: No drug interactions found at this time. He takes no other medications.      Lab Monitoring Plan  CBC weekly for first month, then Q2 weeks for second month, then monthly  CMP monthly  Mag monthly  Phos monthly     Subjective/Objective:  Easton Pardo is a 33 year old male contacted by phone for a follow-up visit for oral chemotherapy.  Easton reports that things continue to go well with his Gleevec. He is dosing 400mg (4 x 100mg) daily. He denies any side effects, missed doses or concerns. He has approximately 7 day supply. Writer emailed TM liaison for del on 12/20.     ORAL CHEMOTHERAPY 6/21/2018 8/7/2018 11/16/2018 12/18/2018   Drug Name Gleevec (Imatinib) Gleevec (Imatinib) Gleevec (Imatinib) Gleevec (Imatinib)   Current Dosage - 200mg 400mg 400mg   Current Schedule Daily BID Daily Daily   Cycle Details Continuous Continuous Continuous Continuous   Start Date of Last Cycle 6/7/2018 - - -   Doses missed in last 2 weeks - 0 0 0   Adherence Assessment - Adherent Adherent Adherent   Adverse Effects Nausea No AE identified during assessment No AE identified during assessment No AE identified during assessment   Pharmacist Intervention(nausea) Yes - - -   Intervention(s) Referral to oncology provider - - -   Any new drug interactions? No - - -       Last PHQ-2 Score on record: No flowsheet data found.    Patient does not report depression symptoms.      Vitals:  BP:   BP Readings from Last 1 Encounters:   12/06/18 122/77     Wt Readings from Last 1 Encounters:   12/06/18 86.7 kg (191 lb 1.6 oz)     Estimated body surface area is 2.04  "meters squared as calculated from the following:    Height as of 12/6/18: 1.73 m (5' 8.11\").    Weight as of 12/6/18: 86.7 kg (191 lb 1.6 oz).    Labs:  _  Result Component Current Result Ref Range   Sodium 139 (12/6/2018) 133 - 144 mmol/L     _  Result Component Current Result Ref Range   Potassium 4.3 (12/6/2018) 3.4 - 5.3 mmol/L     _  Result Component Current Result Ref Range   Calcium 8.6 (12/6/2018) 8.5 - 10.1 mg/dL     No results found for Mag within last 30 days.     No results found for Phos within last 30 days.     _  Result Component Current Result Ref Range   Albumin 4.2 (12/6/2018) 3.4 - 5.0 g/dL     _  Result Component Current Result Ref Range   Urea Nitrogen 12 (12/6/2018) 7 - 30 mg/dL     _  Result Component Current Result Ref Range   Creatinine 0.88 (12/6/2018) 0.66 - 1.25 mg/dL       _  Result Component Current Result Ref Range   AST 26 (12/6/2018) 0 - 45 U/L     _  Result Component Current Result Ref Range   ALT 36 (12/6/2018) 0 - 70 U/L     _  Result Component Current Result Ref Range   Bilirubin Total 0.3 (12/6/2018) 0.2 - 1.3 mg/dL       _  Result Component Current Result Ref Range   WBC 6.5 (12/6/2018) 4.0 - 11.0 10e9/L     _  Result Component Current Result Ref Range   Hemoglobin 14.4 (12/6/2018) 13.3 - 17.7 g/dL     _  Result Component Current Result Ref Range   Platelet Count 199 (12/6/2018) 150 - 450 10e9/L     _  Result Component Current Result Ref Range   Absolute Neutrophil 2.9 (12/6/2018) 1.6 - 8.3 10e9/L               Assessment:  Pt is tolerating current therapy well.     Plan:  Continue with current therapy.     Follow-Up:  4 weeks for TM liaison refill call. 3 months for TM clinician assessment.     Refill Due:  To be delivered on 12/20. Next due on 1/20    Irene Bowman RN  [facility]-Therapy Management  [phone]      "

## 2019-02-10 DIAGNOSIS — C92.10 CML (CHRONIC MYELOCYTIC LEUKEMIA) (H): ICD-10-CM

## 2019-02-13 DIAGNOSIS — C92.10 CML (CHRONIC MYELOCYTIC LEUKEMIA) (H): Primary | ICD-10-CM

## 2019-02-13 RX ORDER — IMATINIB MESYLATE 400 MG/1
400 TABLET, FILM COATED ORAL DAILY
Qty: 120 TABLET | Refills: 2 | Status: CANCELLED | OUTPATIENT
Start: 2019-02-25

## 2019-02-13 RX ORDER — IMATINIB MESYLATE 400 MG/1
400 TABLET, FILM COATED ORAL DAILY
Qty: 120 TABLET | Refills: 2 | Status: SHIPPED | OUTPATIENT
Start: 2019-02-13 | End: 2019-02-18

## 2019-02-15 ENCOUNTER — TELEPHONE (OUTPATIENT)
Dept: ONCOLOGY | Facility: CLINIC | Age: 34
End: 2019-02-15

## 2019-02-15 NOTE — ORAL ONC MGMT
Oral Chemotherapy Monitoring Program    Placed call to patient in follow up of Gleevec therapy. Would Gleevec 400 mg tablets be preferred instead of four 100 mg tablets? Does he still need to split up dose due to symptoms of nausea?     Left message to please call back in follow up of therapy. No patient or drug names were mentioned.    Marly Bush  Student Pharmacist  Oral Chemotherapy Monitoring Program  [facility]  [phone]

## 2019-02-18 RX ORDER — IMATINIB MESYLATE 100 MG/1
400 TABLET, FILM COATED ORAL DAILY
Qty: 120 TABLET | Refills: 2 | Status: SHIPPED | OUTPATIENT
Start: 2019-02-18 | End: 2020-02-06

## 2019-03-19 ENCOUNTER — TELEPHONE (OUTPATIENT)
Dept: ONCOLOGY | Facility: CLINIC | Age: 34
End: 2019-03-19

## 2019-03-19 ENCOUNTER — APPOINTMENT (OUTPATIENT)
Dept: LAB | Facility: CLINIC | Age: 34
End: 2019-03-19
Attending: INTERNAL MEDICINE
Payer: COMMERCIAL

## 2019-03-19 ENCOUNTER — OFFICE VISIT (OUTPATIENT)
Dept: TRANSPLANT | Facility: CLINIC | Age: 34
End: 2019-03-19
Attending: INTERNAL MEDICINE
Payer: COMMERCIAL

## 2019-03-19 VITALS
SYSTOLIC BLOOD PRESSURE: 113 MMHG | RESPIRATION RATE: 18 BRPM | DIASTOLIC BLOOD PRESSURE: 74 MMHG | HEART RATE: 79 BPM | OXYGEN SATURATION: 96 % | TEMPERATURE: 97.8 F | HEIGHT: 68 IN | WEIGHT: 184.1 LBS | BODY MASS INDEX: 27.9 KG/M2

## 2019-03-19 DIAGNOSIS — C92.10 CML (CHRONIC MYELOCYTIC LEUKEMIA) (H): ICD-10-CM

## 2019-03-19 LAB
ALBUMIN SERPL-MCNC: 4.1 G/DL (ref 3.4–5)
ALP SERPL-CCNC: 87 U/L (ref 40–150)
ALT SERPL W P-5'-P-CCNC: 28 U/L (ref 0–70)
ANION GAP SERPL CALCULATED.3IONS-SCNC: 5 MMOL/L (ref 3–14)
AST SERPL W P-5'-P-CCNC: 23 U/L (ref 0–45)
BASOPHILS # BLD AUTO: 0 10E9/L (ref 0–0.2)
BASOPHILS NFR BLD AUTO: 0.4 %
BILIRUB SERPL-MCNC: 0.7 MG/DL (ref 0.2–1.3)
BUN SERPL-MCNC: 13 MG/DL (ref 7–30)
CALCIUM SERPL-MCNC: 9.2 MG/DL (ref 8.5–10.1)
CHLORIDE SERPL-SCNC: 105 MMOL/L (ref 94–109)
CO2 SERPL-SCNC: 26 MMOL/L (ref 20–32)
CREAT SERPL-MCNC: 0.84 MG/DL (ref 0.66–1.25)
DIFFERENTIAL METHOD BLD: NORMAL
EOSINOPHIL # BLD AUTO: 0 10E9/L (ref 0–0.7)
EOSINOPHIL NFR BLD AUTO: 0.7 %
ERYTHROCYTE [DISTWIDTH] IN BLOOD BY AUTOMATED COUNT: 13.2 % (ref 10–15)
GFR SERPL CREATININE-BSD FRML MDRD: >90 ML/MIN/{1.73_M2}
GLUCOSE SERPL-MCNC: 95 MG/DL (ref 70–99)
HCT VFR BLD AUTO: 41.8 % (ref 40–53)
HGB BLD-MCNC: 14.5 G/DL (ref 13.3–17.7)
IMM GRANULOCYTES # BLD: 0 10E9/L (ref 0–0.4)
IMM GRANULOCYTES NFR BLD: 0 %
LYMPHOCYTES # BLD AUTO: 2.9 10E9/L (ref 0.8–5.3)
LYMPHOCYTES NFR BLD AUTO: 52.7 %
MAGNESIUM SERPL-MCNC: 2.2 MG/DL (ref 1.6–2.3)
MCH RBC QN AUTO: 31.9 PG (ref 26.5–33)
MCHC RBC AUTO-ENTMCNC: 34.7 G/DL (ref 31.5–36.5)
MCV RBC AUTO: 92 FL (ref 78–100)
MONOCYTES # BLD AUTO: 0.4 10E9/L (ref 0–1.3)
MONOCYTES NFR BLD AUTO: 6.9 %
NEUTROPHILS # BLD AUTO: 2.2 10E9/L (ref 1.6–8.3)
NEUTROPHILS NFR BLD AUTO: 39.3 %
NRBC # BLD AUTO: 0 10*3/UL
NRBC BLD AUTO-RTO: 0 /100
PHOSPHATE SERPL-MCNC: 2.2 MG/DL (ref 2.5–4.5)
PLATELET # BLD AUTO: 201 10E9/L (ref 150–450)
POTASSIUM SERPL-SCNC: 4.2 MMOL/L (ref 3.4–5.3)
PROT SERPL-MCNC: 7.2 G/DL (ref 6.8–8.8)
RBC # BLD AUTO: 4.54 10E12/L (ref 4.4–5.9)
SODIUM SERPL-SCNC: 136 MMOL/L (ref 133–144)
WBC # BLD AUTO: 5.5 10E9/L (ref 4–11)

## 2019-03-19 PROCEDURE — 81207 BCR/ABL1 GENE MINOR BP: CPT | Performed by: INTERNAL MEDICINE

## 2019-03-19 PROCEDURE — G0463 HOSPITAL OUTPT CLINIC VISIT: HCPCS | Mod: ZF

## 2019-03-19 PROCEDURE — 80053 COMPREHEN METABOLIC PANEL: CPT | Performed by: INTERNAL MEDICINE

## 2019-03-19 PROCEDURE — 36415 COLL VENOUS BLD VENIPUNCTURE: CPT

## 2019-03-19 PROCEDURE — 85025 COMPLETE CBC W/AUTO DIFF WBC: CPT | Performed by: INTERNAL MEDICINE

## 2019-03-19 PROCEDURE — 81206 BCR/ABL1 GENE MAJOR BP: CPT | Performed by: INTERNAL MEDICINE

## 2019-03-19 PROCEDURE — 84100 ASSAY OF PHOSPHORUS: CPT | Performed by: INTERNAL MEDICINE

## 2019-03-19 PROCEDURE — 83735 ASSAY OF MAGNESIUM: CPT | Performed by: INTERNAL MEDICINE

## 2019-03-19 ASSESSMENT — MIFFLIN-ST. JEOR: SCORE: 1761.32

## 2019-03-19 ASSESSMENT — PAIN SCALES - GENERAL: PAINLEVEL: NO PAIN (0)

## 2019-03-19 NOTE — NURSING NOTE
Chief Complaint   Patient presents with     Blood Draw     Left AC butterfly  X 1, labs drawn and sent, vitals completed, checked into next appointment.   Candi Rao RN

## 2019-03-19 NOTE — NURSING NOTE
"Oncology Rooming Note    March 19, 2019 12:10 PM   Easton Pardo is a 33 year old male who presents for:    Chief Complaint   Patient presents with     Blood Draw     Left AC butterfly  X 1, labs drawn and sent, vitals completed, checked into next appointment.     Oncology Clinic Visit     F/U CML     Initial Vitals: /74 (BP Location: Left arm, Patient Position: Sitting, Cuff Size: Adult Regular)   Pulse 79   Temp 97.8  F (36.6  C) (Oral)   Resp 18   Ht 1.73 m (5' 8.11\")   Wt 83.5 kg (184 lb 1.6 oz)   SpO2 96%   BMI 27.90 kg/m   Estimated body mass index is 27.9 kg/m  as calculated from the following:    Height as of this encounter: 1.73 m (5' 8.11\").    Weight as of this encounter: 83.5 kg (184 lb 1.6 oz). Body surface area is 2 meters squared.  No Pain (0) Comment: Data Unavailable   No LMP for male patient.  Allergies reviewed: Yes  Medications reviewed: Yes    Medications: Medication refills not needed today.  Pharmacy name entered into [employer]: [facility] - [place], [place] - [address]    Clinical concerns: None, Dr Bermudez was NOT notified.      SOCRATES ASHRAF LPN            "

## 2019-03-19 NOTE — ORAL ONC MGMT
Oral Chemotherapy Monitoring Program     Placed call to patient in follow up of Gleevec (imatinib) therapy.   Left message requesting call back on 3/19 and 3/21.   No drug names were mentioned.    Thank you for the opportunity to participate in the care of the above patient,  Sagar Wright, PharmD  Hematology/Oncology Clinical Pharmacist  [facility]   [facility]

## 2019-03-19 NOTE — NURSING NOTE
Chief Complaint   Patient presents with     Oncology Clinic Visit     F/U CML     Blood Draw     Labs drawn tolerated well.   Marj Frederick LPN

## 2019-03-20 NOTE — PROGRESS NOTES
"[facility] New Patient Visit  3/19/2019    Disease and Treatment History:  1. Presented in 10/2009 with abdominal fullness, profound fatigue, SOB and found to have splenomegaly, elevated WBC, anemia, and retained platelet count  2. 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 Imatinib started Fall 2009  -- CBC quickly normalized  -- Cytogenetic Remission obtained (unclear date)  -- Major molecular response as of 2011  -- Molecular Remission noted in 6/2012  4. Transfer of care to [facility] in 10/2012 and has been followed there until 6/2017. Waxing and waning p210 and p190 levels all < 0.006% on the IS. Last checked there on 6/2017:  -- P210 0.001% IS  -- P190: 0.0001%  5. Moved to [place] 6/2017    HPI: Easton comes in today for CML follow-up.  He has been taking Gleevec at 200 mg twice daily with doses split due to nausea.  At times is forgot to take the second dose. He says he has not gone a day without missing the Gleevec. He generally feels well.  He denies any nausea, vomiting.  He is some loose stool on the Gleevec which is stable.  He denies any fevers, chills, recent infections.    10 point ROS otherwise negative    Physical Exam:  /74 (BP Location: Left arm, Patient Position: Sitting, Cuff Size: Adult Regular)   Pulse 79   Temp 97.8  F (36.6  C) (Oral)   Resp 18   Ht 1.73 m (5' 8.11\")   Wt 83.5 kg (184 lb 1.6 oz)   SpO2 96%   BMI 27.90 kg/m    Gen: Well appearing.   HEENt: OP clear, no thrush, no GENESIS  Lungs: CTAB no crackles or wheezes  CV: RRR no r/m/g  Abd: soft, NT/ND, no HSM  Ext: no edema      Labs:  CBC RESULTS:   Recent Labs   Lab Test 03/19/19  1151   WBC 5.5   RBC 4.54   HGB 14.5   HCT 41.8   MCV 92   MCH 31.9   MCHC 34.7   RDW 13.2        Last Comprehensive Metabolic Panel:  Sodium   Date Value Ref Range Status   03/19/2019 136 133 - 144 mmol/L Final     Potassium   Date Value Ref Range Status   03/19/2019 4.2 3.4 - 5.3 " mmol/L Final     Chloride   Date Value Ref Range Status   03/19/2019 105 94 - 109 mmol/L Final     Carbon Dioxide   Date Value Ref Range Status   03/19/2019 26 20 - 32 mmol/L Final     Anion Gap   Date Value Ref Range Status   03/19/2019 5 3 - 14 mmol/L Final     Glucose   Date Value Ref Range Status   03/19/2019 95 70 - 99 mg/dL Final     Urea Nitrogen   Date Value Ref Range Status   03/19/2019 13 7 - 30 mg/dL Final     Creatinine   Date Value Ref Range Status   03/19/2019 0.84 0.66 - 1.25 mg/dL Final     GFR Estimate   Date Value Ref Range Status   03/19/2019 >90 >60 mL/min/[1.73_m2] Final     Comment:     Non  GFR Calc  Starting 12/18/2018, serum creatinine based estimated GFR (eGFR) will be   calculated using the Chronic Kidney Disease Epidemiology Collaboration   (CKD-EPI) equation.       Calcium   Date Value Ref Range Status   03/19/2019 9.2 8.5 - 10.1 mg/dL Final     Bilirubin Total   Date Value Ref Range Status   03/19/2019 0.7 0.2 - 1.3 mg/dL Final     Alkaline Phosphatase   Date Value Ref Range Status   03/19/2019 87 40 - 150 U/L Final     ALT   Date Value Ref Range Status   03/19/2019 28 0 - 70 U/L Final     AST   Date Value Ref Range Status   03/19/2019 23 0 - 45 U/L Final     BCR-ABL P210 pending    Last p190 checked 6/2018 undetectable      A/P: 32 yo man with CP CML since 2009 with molecular remission on imatinib    1. CML: Currently on imatinib 400 mg daily that he takes 200 mg BID due to nausea. BCR-ABL pending from today.     2. Heme: Good counts    3. ID: no infectious issues    4. Renal: normal renal function    5. GI: no liver issues. Mild nausea sometimes with the imatinib    Final Plan:  - follow-up BCRShaziaABL  - Lara 3 months      Sagar Escobar MD   PGY6  Heme Onc Fellow    Seen and discussed with  Sonia Bermudez      Attending Addendum:  The patient was seen and evaluated. All medical records, testing results were reviewed and the plan was discussed with the fellow. The  note above has been edited to reflect my findings.    Additional comments:  Easton is doing well with no concerns. No physical issues besides mild nausea. Wonders about the impact of the gleevec on the ability to father a child    Exam unremarkable.  Labs: Phos mildly low. Will have him eat phosphorous rich foods    Bcr-abl pending    Plan:  - follow-up bcr-abl  - discussed that the gleevec may impact the motility of his sperm but shouldn't have any impact on any fetus that is conceived while he is on the medication  Shazia bermudez follow-up in 3 months    Sonia Bermudez    Addendum: 3/25/2019:Called him on 3/25 to discuss but only got his voicemail so asked him to call the clinic    Minor:  This sample is positive for BCR-ABL1 transcripts of the minor(p190)   breakpoint cluster regions with a   BCR-ABL1/ABL1 ratio of <0.046%.    --- Last check was negative in 6/2018    This sample is positive for BCR-ABL1 transcripts of the major (p210)   breakpoint cluster regions with a   BCR-ABL1/ABL1 ratio of <0.045%.  -- Last check in 12/2018 was 0.028%.    Mild change. Will plan for repeat in 3 months and will check for bcr-abl mutations. If still trending up will consider a change to dasatinib.    Sonia Bermudez    Addendum 3/29/2019: 1035 am  Called Easton again but just got his voicemail. Left a message informing him to check his MyChart for recommendations and results and to let us know that he got the message. Recommended taking the gleevec as prescribed at 400 mg daily instead of split dosing of 200 mg twice a day as he has been.    Sonia Bermudez

## 2019-03-22 LAB
COPATH REPORT: NORMAL
COPATH REPORT: NORMAL

## 2019-05-13 DIAGNOSIS — C92.10 CML (CHRONIC MYELOCYTIC LEUKEMIA) (H): ICD-10-CM

## 2019-05-14 DIAGNOSIS — C92.10 CML (CHRONIC MYELOCYTIC LEUKEMIA) (H): Primary | ICD-10-CM

## 2019-05-14 RX ORDER — IMATINIB MESYLATE 100 MG/1
400 TABLET, FILM COATED ORAL DAILY
Qty: 120 TABLET | Refills: 3 | Status: SHIPPED | OUTPATIENT
Start: 2019-05-14 | End: 2020-02-06

## 2019-06-18 ENCOUNTER — APPOINTMENT (OUTPATIENT)
Dept: LAB | Facility: CLINIC | Age: 34
End: 2019-06-18
Attending: INTERNAL MEDICINE
Payer: COMMERCIAL

## 2019-06-18 ENCOUNTER — OFFICE VISIT (OUTPATIENT)
Dept: TRANSPLANT | Facility: CLINIC | Age: 34
End: 2019-06-18
Attending: INTERNAL MEDICINE
Payer: COMMERCIAL

## 2019-06-18 VITALS
TEMPERATURE: 98.1 F | SYSTOLIC BLOOD PRESSURE: 113 MMHG | BODY MASS INDEX: 27.89 KG/M2 | HEART RATE: 69 BPM | RESPIRATION RATE: 16 BRPM | WEIGHT: 184 LBS | OXYGEN SATURATION: 98 % | DIASTOLIC BLOOD PRESSURE: 72 MMHG

## 2019-06-18 DIAGNOSIS — C92.10 CML (CHRONIC MYELOCYTIC LEUKEMIA) (H): ICD-10-CM

## 2019-06-18 LAB
ALBUMIN SERPL-MCNC: 4.3 G/DL (ref 3.4–5)
ALP SERPL-CCNC: 76 U/L (ref 40–150)
ALT SERPL W P-5'-P-CCNC: 28 U/L (ref 0–70)
ANION GAP SERPL CALCULATED.3IONS-SCNC: 4 MMOL/L (ref 3–14)
AST SERPL W P-5'-P-CCNC: 23 U/L (ref 0–45)
BASOPHILS # BLD AUTO: 0 10E9/L (ref 0–0.2)
BASOPHILS NFR BLD AUTO: 0.4 %
BILIRUB SERPL-MCNC: 0.7 MG/DL (ref 0.2–1.3)
BUN SERPL-MCNC: 10 MG/DL (ref 7–30)
CALCIUM SERPL-MCNC: 8.6 MG/DL (ref 8.5–10.1)
CHLORIDE SERPL-SCNC: 104 MMOL/L (ref 94–109)
CO2 SERPL-SCNC: 28 MMOL/L (ref 20–32)
CREAT SERPL-MCNC: 0.86 MG/DL (ref 0.66–1.25)
DIFFERENTIAL METHOD BLD: ABNORMAL
EOSINOPHIL # BLD AUTO: 0 10E9/L (ref 0–0.7)
EOSINOPHIL NFR BLD AUTO: 0.8 %
ERYTHROCYTE [DISTWIDTH] IN BLOOD BY AUTOMATED COUNT: 13 % (ref 10–15)
GFR SERPL CREATININE-BSD FRML MDRD: >90 ML/MIN/{1.73_M2}
GLUCOSE SERPL-MCNC: 84 MG/DL (ref 70–99)
HCT VFR BLD AUTO: 41.1 % (ref 40–53)
HGB BLD-MCNC: 14.1 G/DL (ref 13.3–17.7)
IMM GRANULOCYTES # BLD: 0 10E9/L (ref 0–0.4)
IMM GRANULOCYTES NFR BLD: 0.2 %
LYMPHOCYTES # BLD AUTO: 2.9 10E9/L (ref 0.8–5.3)
LYMPHOCYTES NFR BLD AUTO: 56.9 %
MAGNESIUM SERPL-MCNC: 2.1 MG/DL (ref 1.6–2.3)
MCH RBC QN AUTO: 32.2 PG (ref 26.5–33)
MCHC RBC AUTO-ENTMCNC: 34.3 G/DL (ref 31.5–36.5)
MCV RBC AUTO: 94 FL (ref 78–100)
MONOCYTES # BLD AUTO: 0.4 10E9/L (ref 0–1.3)
MONOCYTES NFR BLD AUTO: 8.1 %
NEUTROPHILS # BLD AUTO: 1.7 10E9/L (ref 1.6–8.3)
NEUTROPHILS NFR BLD AUTO: 33.6 %
NRBC # BLD AUTO: 0 10*3/UL
NRBC BLD AUTO-RTO: 0 /100
PHOSPHATE SERPL-MCNC: 2.6 MG/DL (ref 2.5–4.5)
PLATELET # BLD AUTO: 182 10E9/L (ref 150–450)
POTASSIUM SERPL-SCNC: 4.4 MMOL/L (ref 3.4–5.3)
PROT SERPL-MCNC: 7.2 G/DL (ref 6.8–8.8)
RBC # BLD AUTO: 4.38 10E12/L (ref 4.4–5.9)
SODIUM SERPL-SCNC: 136 MMOL/L (ref 133–144)
WBC # BLD AUTO: 5 10E9/L (ref 4–11)

## 2019-06-18 PROCEDURE — 83735 ASSAY OF MAGNESIUM: CPT | Performed by: INTERNAL MEDICINE

## 2019-06-18 PROCEDURE — 81479 UNLISTED MOLECULAR PATHOLOGY: CPT | Performed by: INTERNAL MEDICINE

## 2019-06-18 PROCEDURE — 85025 COMPLETE CBC W/AUTO DIFF WBC: CPT | Performed by: INTERNAL MEDICINE

## 2019-06-18 PROCEDURE — 80053 COMPREHEN METABOLIC PANEL: CPT | Performed by: INTERNAL MEDICINE

## 2019-06-18 PROCEDURE — 36415 COLL VENOUS BLD VENIPUNCTURE: CPT

## 2019-06-18 PROCEDURE — 81206 BCR/ABL1 GENE MAJOR BP: CPT | Performed by: INTERNAL MEDICINE

## 2019-06-18 PROCEDURE — 84100 ASSAY OF PHOSPHORUS: CPT | Performed by: INTERNAL MEDICINE

## 2019-06-18 PROCEDURE — 81207 BCR/ABL1 GENE MINOR BP: CPT | Performed by: INTERNAL MEDICINE

## 2019-06-18 ASSESSMENT — PAIN SCALES - GENERAL: PAINLEVEL: NO PAIN (0)

## 2019-06-18 NOTE — NURSING NOTE
"Oncology Rooming Note    June 18, 2019 12:14 PM   Easton Pardo is a 33 year old male who presents for:    Chief Complaint   Patient presents with     Blood Draw     Labs drawn via  by RN in lab. VS taken.      RECHECK     Patient here for provider visit r/t CML     Initial Vitals: /72 (BP Location: Right arm, Patient Position: Sitting, Cuff Size: Adult Regular)   Pulse 69   Temp 98.1  F (36.7  C) (Oral)   Resp 16   Wt 83.5 kg (184 lb)   SpO2 98%   BMI 27.89 kg/m   Estimated body mass index is 27.89 kg/m  as calculated from the following:    Height as of 3/19/19: 1.73 m (5' 8.11\").    Weight as of this encounter: 83.5 kg (184 lb). Body surface area is 2 meters squared.  No Pain (0) Comment: Data Unavailable   No LMP for male patient.  Allergies reviewed: Yes  Medications reviewed: Yes    Medications: Medication refills not needed today.  Pharmacy name entered into [employer]: [facility] - [place], [place] - [address]    Clinical concerns: NA      Cori Hernandez RN              "

## 2019-06-18 NOTE — PROGRESS NOTES
[facility] New Patient Visit  6/18/2019    Disease and Treatment History:  1. Presented in 10/2009 with abdominal fullness, profound fatigue, SOB and found to have splenomegaly, elevated WBC, anemia, and retained platelet count  2. 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 Imatinib started Fall 2009  -- CBC quickly normalized  -- Cytogenetic Remission obtained (unclear date)  -- Major molecular response as of 2011  -- Molecular Remission noted in 6/2012  4. Transfer of care to [facility] in 10/2012 and has been followed there until 6/2017. Waxing and waning p210 and p190 levels all < 0.006% on the IS. Last checked there on 6/2017:  -- P210 0.001% IS  -- P190: 0.0001%  5. Moved to [place] 6/2017    HPI: Mohan comes in today for CML follow-up.  He has been taking Gleevec at 200 mg twice daily with doses split due to nausea.  When I last saw him his BCR-ABL had gone up just slightly but he also had missed a handful of doses in the preceeding weeks. He notes he is feeling well. No fevers or chills, no recent infections, no vomiting, no abdominal pain, no appetite change, no bleeding or bruising. Has been more consistent with him imatinib but is still splitting it 200 mg BID. Is taking a new coding course at the  and is planning on a career change sometime soon.    10 point ROS otherwise negative    Physical Exam:  /72 (BP Location: Right arm, Patient Position: Sitting, Cuff Size: Adult Regular)   Pulse 69   Temp 98.1  F (36.7  C) (Oral)   Resp 16   Wt 83.5 kg (184 lb)   SpO2 98%   BMI 27.89 kg/m    Gen: Well appearing.   HEENt: OP clear, no thrush, no GENESIS  Lungs: CTAB no crackles or wheezes  CV: RRR no r/m/g  Abd: soft, NT/ND, no HSM  Ext: no edema      Labs:  Results for MOHAN GARCIA (MRN [id number]) as of 6/18/2019 12:50   Ref. Range 6/18/2019 12:02   Sodium Latest Ref Range: 133 - 144 mmol/L 136   Potassium Latest Ref Range: 3.4 - 5.3  mmol/L 4.4   Chloride Latest Ref Range: 94 - 109 mmol/L 104   Carbon Dioxide Latest Ref Range: 20 - 32 mmol/L 28   Urea Nitrogen Latest Ref Range: 7 - 30 mg/dL 10   Creatinine Latest Ref Range: 0.66 - 1.25 mg/dL 0.86   GFR Estimate Latest Ref Range: >60 mL/min/1.73_m2 >90   GFR Estimate If Black Latest Ref Range: >60 mL/min/1.73_m2 >90   Calcium Latest Ref Range: 8.5 - 10.1 mg/dL 8.6   Anion Gap Latest Ref Range: 3 - 14 mmol/L 4   Magnesium Latest Ref Range: 1.6 - 2.3 mg/dL 2.1   Phosphorus Latest Ref Range: 2.5 - 4.5 mg/dL 2.6   Albumin Latest Ref Range: 3.4 - 5.0 g/dL 4.3   Protein Total Latest Ref Range: 6.8 - 8.8 g/dL 7.2   Bilirubin Total Latest Ref Range: 0.2 - 1.3 mg/dL 0.7   Alkaline Phosphatase Latest Ref Range: 40 - 150 U/L 76   ALT Latest Ref Range: 0 - 70 U/L 28   AST Latest Ref Range: 0 - 45 U/L 23   Glucose Latest Ref Range: 70 - 99 mg/dL 84   WBC Latest Ref Range: 4.0 - 11.0 10e9/L 5.0   Hemoglobin Latest Ref Range: 13.3 - 17.7 g/dL 14.1   Hematocrit Latest Ref Range: 40.0 - 53.0 % 41.1   Platelet Count Latest Ref Range: 150 - 450 10e9/L 182   RBC Count Latest Ref Range: 4.4 - 5.9 10e12/L 4.38 (L)   MCV Latest Ref Range: 78 - 100 fl 94   MCH Latest Ref Range: 26.5 - 33.0 pg 32.2   MCHC Latest Ref Range: 31.5 - 36.5 g/dL 34.3   RDW Latest Ref Range: 10.0 - 15.0 % 13.0   Diff Method Unknown Automated Method   % Neutrophils Latest Units: % 33.6   % Lymphocytes Latest Units: % 56.9   % Monocytes Latest Units: % 8.1   % Eosinophils Latest Units: % 0.8   % Basophils Latest Units: % 0.4   % Immature Granulocytes Latest Units: % 0.2   Nucleated RBCs Latest Ref Range: 0 /100 0   Absolute Neutrophil Latest Ref Range: 1.6 - 8.3 10e9/L 1.7   Absolute Lymphocytes Latest Ref Range: 0.8 - 5.3 10e9/L 2.9   Absolute Monocytes Latest Ref Range: 0.0 - 1.3 10e9/L 0.4   Absolute Eosinophils Latest Ref Range: 0.0 - 0.7 10e9/L 0.0   Absolute Basophils Latest Ref Range: 0.0 - 0.2 10e9/L 0.0   Abs Immature Granulocytes Latest  Ref Range: 0 - 0.4 10e9/L 0.0   Absolute Nucleated RBC Unknown 0.0     BCR-ABL P210 and P190 pending    BCR-ABL mutational analysis pending      A/P: 34 yo man with CP CML since 2009 with molecular remission on imatinib    1. CML: Currently on imatinib 400 mg daily that he takes 200 mg BID due to nausea. BCR-ABL last check was mildly up at < 0.045%. Repeat pending today along with P190 and mutational analysis in case it is rising. Next steps depending on those results.    2. Heme: Good counts    3. ID: no infectious issues    4. Renal: normal renal function    5. GI: no liver issues. Mild nausea sometimes with the imatinib    Final Plan:  - follow-up BCR-ABL  - Lara 4 months    Sonia Bermudez    Addendum 6/24/2019:  BCR-ABL p190 undetectable  BCR-ABL p210 <0.043% (previously < 0.045%) so overall stable    Will encourage him to take it 400 mg daily to see if that helps get him undetectable again.    Sonia Bermudez

## 2019-06-18 NOTE — NURSING NOTE
Chief Complaint   Patient presents with     Blood Draw     Labs drawn via  by RN in lab. VS taken.      Labs drawn via venipuncture. Vital signs taken. Checked into next appointment.   Azul Grajeda RN

## 2019-06-20 ENCOUNTER — TELEPHONE (OUTPATIENT)
Dept: PHARMACY | Facility: CLINIC | Age: 34
End: 2019-06-20

## 2019-06-20 LAB — COPATH REPORT: NORMAL

## 2019-06-20 NOTE — ORAL ONC MGMT
Oral Chemotherapy Monitoring Program     Placed call to patient in follow up of oral chemotherapy. Left message requesting call back. No drug names were mentioned. Will update when response received.     Medardo Flores, PharmD, MS  Hematology/Oncology Clinical Pharmacist  [facility]  [facility]

## 2019-06-21 LAB — COPATH REPORT: NORMAL

## 2019-06-27 ENCOUNTER — TELEPHONE (OUTPATIENT)
Dept: ONCOLOGY | Facility: CLINIC | Age: 34
End: 2019-06-27

## 2019-06-27 NOTE — ORAL ONC MGMT
Oral Chemotherapy Monitoring Program      Placed call 6/26 and 6/28 to patient in follow up of oral chemotherapy. Left message requesting call back. No drug names were mentioned. Will update when response received.     Adherence: PDC=1, patient refills imatinib every month, no concerns about adherence.       Thank you for the opportunity to participate in the care of the above patient,  Sagar Wright, PharmD  Hematology/Oncology Clinical Pharmacist  [facility]  [facility]  [phone]

## 2019-06-28 LAB
BCR/ABL1 KINASE DOMAIN MUT ANL BLD/T: NORMAL
SPECIMEN SOURCE: NORMAL

## 2019-07-08 ENCOUNTER — TELEPHONE (OUTPATIENT)
Dept: ONCOLOGY | Facility: CLINIC | Age: 34
End: 2019-07-08

## 2019-07-08 NOTE — ORAL ONC MGMT
Oral Chemotherapy Monitoring Program      Placed call 7/8 to patient in follow up of oral chemotherapy. Left message requesting call back. No drug names were mentioned. Will update when response received.      Sagar Wright, PharmD  Hematology/Oncology Clinical Pharmacist  [facility]  [facility]      ADDENDUM:    Oral Chemotherapy Monitoring Program      Patient returned call in follow up of oral chemotherapy. Easton is currently on imatinib for CML. He has been on therapy since 2009. He reports he takes 2 x 100mg BID due to nausea. Per Dr. Bermudez's MyChart 6/24 recommended to take  4 x 100mg tablets daily rather then 2 x 100mg tablets BID.     He was agreeable to start with 3 x 100mg tablets with food then take the 4th 100mg tablet about 30-60 minutes later. In the past he gets nausea when taking 400mg dose at one time. He has concerns about nausea, but feels this will be sustainable. He will start the plan tomorrow 7/9/19. He denies any medication changes or new/worse side effects. He missed about 1 or 2 of the evening doses over the past month when he was taking twice daily. PDC =1, so no concerns about adherence as he refills on time. He will follow up with Dr. Bermudez in October. He will reach out if this plan is not sustainable.         Sagar Wright, PharmD  Hematology/Oncology Clinical Pharmacist  [facility]  [facility]

## 2019-09-08 DIAGNOSIS — C92.10 CML (CHRONIC MYELOCYTIC LEUKEMIA) (H): ICD-10-CM

## 2019-09-11 DIAGNOSIS — C92.10 CML (CHRONIC MYELOCYTIC LEUKEMIA) (H): Primary | ICD-10-CM

## 2019-09-11 RX ORDER — IMATINIB MESYLATE 100 MG/1
400 TABLET, FILM COATED ORAL DAILY
Qty: 120 TABLET | Refills: 3 | Status: SHIPPED | OUTPATIENT
Start: 2019-09-11 | End: 2020-05-01

## 2019-10-04 ENCOUNTER — PATIENT OUTREACH (OUTPATIENT)
Dept: ONCOLOGY | Facility: CLINIC | Age: 34
End: 2019-10-04

## 2019-10-04 NOTE — PROGRESS NOTES
Oncology RN Care Coordination Note:     Patient left writer a voicemail stating his cat isn't doing well and he was wondering if he could reschedule his appointment on Tuesday?  Writer looked forward at Dr. Bermudez's schedule and there are no return appointments available until the middle to end of December.      Writer attempted to reach the patient to discuss this, he didn't answer.  Writer left a detailed message with this information and if he wishes to cancel his appointment that is his prerogative, but he is doing so knowing that there are no appointments available for him with Dr. Bermudez.     Marti Ma, RN BSN   [facility]  Nurse Coordinator

## 2019-10-08 ENCOUNTER — APPOINTMENT (OUTPATIENT)
Dept: LAB | Facility: CLINIC | Age: 34
End: 2019-10-08
Attending: INTERNAL MEDICINE
Payer: COMMERCIAL

## 2019-10-08 ENCOUNTER — OFFICE VISIT (OUTPATIENT)
Dept: TRANSPLANT | Facility: CLINIC | Age: 34
End: 2019-10-08
Attending: INTERNAL MEDICINE
Payer: COMMERCIAL

## 2019-10-08 VITALS
SYSTOLIC BLOOD PRESSURE: 118 MMHG | TEMPERATURE: 98.7 F | DIASTOLIC BLOOD PRESSURE: 69 MMHG | HEART RATE: 89 BPM | RESPIRATION RATE: 16 BRPM | OXYGEN SATURATION: 97 % | WEIGHT: 174.9 LBS | HEIGHT: 68 IN | BODY MASS INDEX: 26.51 KG/M2

## 2019-10-08 DIAGNOSIS — C92.10 CML (CHRONIC MYELOCYTIC LEUKEMIA) (H): ICD-10-CM

## 2019-10-08 LAB
ALBUMIN SERPL-MCNC: 4.1 G/DL (ref 3.4–5)
ALP SERPL-CCNC: 72 U/L (ref 40–150)
ALT SERPL W P-5'-P-CCNC: 27 U/L (ref 0–70)
ANION GAP SERPL CALCULATED.3IONS-SCNC: 5 MMOL/L (ref 3–14)
AST SERPL W P-5'-P-CCNC: 22 U/L (ref 0–45)
BASOPHILS # BLD AUTO: 0 10E9/L (ref 0–0.2)
BASOPHILS NFR BLD AUTO: 0.5 %
BILIRUB SERPL-MCNC: 0.5 MG/DL (ref 0.2–1.3)
BUN SERPL-MCNC: 10 MG/DL (ref 7–30)
CALCIUM SERPL-MCNC: 8.6 MG/DL (ref 8.5–10.1)
CHLORIDE SERPL-SCNC: 105 MMOL/L (ref 94–109)
CO2 SERPL-SCNC: 26 MMOL/L (ref 20–32)
CREAT SERPL-MCNC: 0.84 MG/DL (ref 0.66–1.25)
DIFFERENTIAL METHOD BLD: ABNORMAL
EOSINOPHIL # BLD AUTO: 0 10E9/L (ref 0–0.7)
EOSINOPHIL NFR BLD AUTO: 0.9 %
ERYTHROCYTE [DISTWIDTH] IN BLOOD BY AUTOMATED COUNT: 12.9 % (ref 10–15)
GFR SERPL CREATININE-BSD FRML MDRD: >90 ML/MIN/{1.73_M2}
GLUCOSE SERPL-MCNC: 73 MG/DL (ref 70–99)
HCT VFR BLD AUTO: 40.4 % (ref 40–53)
HGB BLD-MCNC: 14.1 G/DL (ref 13.3–17.7)
IMM GRANULOCYTES # BLD: 0 10E9/L (ref 0–0.4)
IMM GRANULOCYTES NFR BLD: 0.2 %
LYMPHOCYTES # BLD AUTO: 2.3 10E9/L (ref 0.8–5.3)
LYMPHOCYTES NFR BLD AUTO: 51.7 %
MAGNESIUM SERPL-MCNC: 2.2 MG/DL (ref 1.6–2.3)
MCH RBC QN AUTO: 32.6 PG (ref 26.5–33)
MCHC RBC AUTO-ENTMCNC: 34.9 G/DL (ref 31.5–36.5)
MCV RBC AUTO: 94 FL (ref 78–100)
MONOCYTES # BLD AUTO: 0.4 10E9/L (ref 0–1.3)
MONOCYTES NFR BLD AUTO: 8.2 %
NEUTROPHILS # BLD AUTO: 1.7 10E9/L (ref 1.6–8.3)
NEUTROPHILS NFR BLD AUTO: 38.5 %
NRBC # BLD AUTO: 0 10*3/UL
NRBC BLD AUTO-RTO: 0 /100
PHOSPHATE SERPL-MCNC: 1.8 MG/DL (ref 2.5–4.5)
PLATELET # BLD AUTO: 187 10E9/L (ref 150–450)
POTASSIUM SERPL-SCNC: 4.2 MMOL/L (ref 3.4–5.3)
PROT SERPL-MCNC: 7 G/DL (ref 6.8–8.8)
RBC # BLD AUTO: 4.32 10E12/L (ref 4.4–5.9)
SODIUM SERPL-SCNC: 136 MMOL/L (ref 133–144)
WBC # BLD AUTO: 4.4 10E9/L (ref 4–11)

## 2019-10-08 PROCEDURE — 85025 COMPLETE CBC W/AUTO DIFF WBC: CPT | Performed by: INTERNAL MEDICINE

## 2019-10-08 PROCEDURE — 81207 BCR/ABL1 GENE MINOR BP: CPT | Performed by: INTERNAL MEDICINE

## 2019-10-08 PROCEDURE — 80053 COMPREHEN METABOLIC PANEL: CPT | Performed by: INTERNAL MEDICINE

## 2019-10-08 PROCEDURE — G0463 HOSPITAL OUTPT CLINIC VISIT: HCPCS | Mod: 25

## 2019-10-08 PROCEDURE — 36415 COLL VENOUS BLD VENIPUNCTURE: CPT

## 2019-10-08 PROCEDURE — 25000128 H RX IP 250 OP 636: Mod: ZF | Performed by: INTERNAL MEDICINE

## 2019-10-08 PROCEDURE — 90686 IIV4 VACC NO PRSV 0.5 ML IM: CPT | Mod: ZF | Performed by: INTERNAL MEDICINE

## 2019-10-08 PROCEDURE — G0008 ADMIN INFLUENZA VIRUS VAC: HCPCS

## 2019-10-08 PROCEDURE — 81206 BCR/ABL1 GENE MAJOR BP: CPT | Performed by: INTERNAL MEDICINE

## 2019-10-08 PROCEDURE — 84100 ASSAY OF PHOSPHORUS: CPT | Performed by: INTERNAL MEDICINE

## 2019-10-08 PROCEDURE — 83735 ASSAY OF MAGNESIUM: CPT | Performed by: INTERNAL MEDICINE

## 2019-10-08 RX ADMIN — INFLUENZA A VIRUS A/BRISBANE/02/2018 IVR-190 (H1N1) ANTIGEN (FORMALDEHYDE INACTIVATED), INFLUENZA A VIRUS A/KANSAS/14/2017 X-327 (H3N2) ANTIGEN (FORMALDEHYDE INACTIVATED), INFLUENZA B VIRUS B/PHUKET/3073/2013 ANTIGEN (FORMALDEHYDE INACTIVATED), AND INFLUENZA B VIRUS B/MARYLAND/15/2016 BX-69A ANTIGEN (FORMALDEHYDE INACTIVATED) 0.5 ML: 15; 15; 15; 15 INJECTION, SUSPENSION INTRAMUSCULAR at 11:33

## 2019-10-08 ASSESSMENT — MIFFLIN-ST. JEOR: SCORE: 1714.59

## 2019-10-08 ASSESSMENT — PAIN SCALES - GENERAL: PAINLEVEL: NO PAIN (0)

## 2019-10-08 NOTE — PROGRESS NOTES
[facility] Follow-up Visit  10/8/2019    Disease and Treatment History:  1. Presented in 10/2009 with abdominal fullness, profound fatigue, SOB and found to have splenomegaly, elevated WBC, anemia, and retained platelet count  2. 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 Imatinib started Fall 2009  -- CBC quickly normalized  -- Cytogenetic remission obtained (unclear date)  -- Major molecular response as of 2011  -- Molecular remission noted in 6/2012  4. Transfer of care to [facility] in 10/2012 and has been followed there until 6/2017. Waxing and waning p210 and p190 levels all < 0.006% on the IS. Last checked there on 6/2017:  -- P210 0.001% IS  -- P190: 0.0001%  5. Moved to [place] 6/2017  6. 6/2019 BCR-ABL p190 undetectable; BCR-ABL p210 <0.043%    HPI: Mohan comes in today for CML follow-up. He has been taking Gleevec at 400 mg daily (splitting to 300mg and 100mg with 30 mins apart). He notes that this can decrease his nausea. Since last visit, he did not miss any doses for 2 months; in the last month he did miss a few doses. States he is very busy with life, studying, moving. Ane his cat has been sick. Overall feels well. No recent infection. No fever, chills or shortness of breath. Is planning a trip to [facility] in the winter.    10 point ROS otherwise negative    Physical Exam:  /69 (BP Location: Right arm, Patient Position: Sitting, Cuff Size: Adult Regular)   Pulse 89   Temp 98.7  F (37.1  C) (Oral)   Resp 16   Wt 79.3 kg (174 lb 14.4 oz)   SpO2 97%   BMI 26.51 kg/m    Gen: Well appearing.   HEENt: OP clear, no thrush, no GENESIS  Lungs: CTAB no crackles or wheezes  CV: RRR no r/m/g  Abd: Soft, NT/ND, no HSM  Ext: No edema    Labs:  Results for MOHAN GARCIA (MRN [id number]) as of 10/8/2019 11:58   Ref. Range 10/8/2019 10:51   Sodium Latest Ref Range: 133 - 144 mmol/L 136   Potassium Latest Ref Range: 3.4 - 5.3 mmol/L 4.2   Chloride  Latest Ref Range: 94 - 109 mmol/L 105   Carbon Dioxide Latest Ref Range: 20 - 32 mmol/L 26   Urea Nitrogen Latest Ref Range: 7 - 30 mg/dL 10   Creatinine Latest Ref Range: 0.66 - 1.25 mg/dL 0.84   GFR Estimate Latest Ref Range: >60 mL/min/1.73_m2 >90   GFR Estimate If Black Latest Ref Range: >60 mL/min/1.73_m2 >90   Calcium Latest Ref Range: 8.5 - 10.1 mg/dL 8.6   Anion Gap Latest Ref Range: 3 - 14 mmol/L 5   Magnesium Latest Ref Range: 1.6 - 2.3 mg/dL 2.2   Phosphorus Latest Ref Range: 2.5 - 4.5 mg/dL 1.8 (L)   Albumin Latest Ref Range: 3.4 - 5.0 g/dL 4.1   Protein Total Latest Ref Range: 6.8 - 8.8 g/dL 7.0   Bilirubin Total Latest Ref Range: 0.2 - 1.3 mg/dL 0.5   Alkaline Phosphatase Latest Ref Range: 40 - 150 U/L 72   ALT Latest Ref Range: 0 - 70 U/L 27   AST Latest Ref Range: 0 - 45 U/L 22   Glucose Latest Ref Range: 70 - 99 mg/dL 73   WBC Latest Ref Range: 4.0 - 11.0 10e9/L 4.4   Hemoglobin Latest Ref Range: 13.3 - 17.7 g/dL 14.1   Hematocrit Latest Ref Range: 40.0 - 53.0 % 40.4   Platelet Count Latest Ref Range: 150 - 450 10e9/L 187   RBC Count Latest Ref Range: 4.4 - 5.9 10e12/L 4.32 (L)   MCV Latest Ref Range: 78 - 100 fl 94   MCH Latest Ref Range: 26.5 - 33.0 pg 32.6   MCHC Latest Ref Range: 31.5 - 36.5 g/dL 34.9   RDW Latest Ref Range: 10.0 - 15.0 % 12.9   Diff Method Unknown Automated Method   % Neutrophils Latest Units: % 38.5   % Lymphocytes Latest Units: % 51.7   % Monocytes Latest Units: % 8.2   % Eosinophils Latest Units: % 0.9   % Basophils Latest Units: % 0.5   % Immature Granulocytes Latest Units: % 0.2   Nucleated RBCs Latest Ref Range: 0 /100 0   Absolute Neutrophil Latest Ref Range: 1.6 - 8.3 10e9/L 1.7   Absolute Lymphocytes Latest Ref Range: 0.8 - 5.3 10e9/L 2.3   Absolute Monocytes Latest Ref Range: 0.0 - 1.3 10e9/L 0.4   Absolute Eosinophils Latest Ref Range: 0.0 - 0.7 10e9/L 0.0   Absolute Basophils Latest Ref Range: 0.0 - 0.2 10e9/L 0.0   Abs Immature Granulocytes Latest Ref Range: 0 -  0.4 10e9/L 0.0   Absolute Nucleated RBC Unknown 0.0       BCR-ABL P210 and P190 pending    A/P: 34 year old man with CP CML since 2009 with molecular remission on imatinib.    1. CML: Currently on imatinib 400 mg daily (splitting to 300mg and 100mg with 30 mins apart due to nausea). Repeat P210 and P190 pending today. Will call him to discuss results and next step.    2. Heme: Good counts    3. ID: No infectious issues  - flu shot    4. Renal: Normal renal function    5. GI: No liver issues. Mild nausea sometimes with the imatinib    Final Plan:  - Follow-up BCR-ABL  Shazia Bermudez 4 months    Staffed with Dr. Lara Melgar MD, PhD  Hem/Onc Fellow    Attending Addendum:  The patient was seen and evaluated. All medical records, testing results were reviewed and the plan was discussed with the fellow. The note above has been edited to reflect my findings.    Additional comments:  Easton is overall doing well. Some stable nausea. No new issues for him beyond stress and intermittently missing a dose.    Exam unchanged. Labs stable. BCR-ABL pending    Plan:  - follow-up BCR-ABL  Shazia bermudez in 4 months with labs prior    Sonia Bermudez MD    Addendum 10/14/2019:  BCR-ABL p190 negative  BCR-ABL p210 < 0.034% (down from 0.043% at last check in 6/2019).    Will inform Easton via [employer]    Sonia Bermudez MD

## 2019-10-08 NOTE — NURSING NOTE
Chief Complaint   Patient presents with     Oncology Clinic Visit     Return: CML (chronic myelocytic leukemia)     Blood Draw     Labs drawn via VPT by MA in LAB. VS taken. Pt. checked in for appt.        Patrica Rivera, CMA

## 2019-10-08 NOTE — NURSING NOTE
"Oncology Rooming Note    October 8, 2019 10:56 AM   Easton Pardo is a 34 year old male who presents for:    Chief Complaint   Patient presents with     Oncology Clinic Visit     Return: CML (chronic myelocytic leukemia)     Initial Vitals: /69 (BP Location: Right arm, Patient Position: Sitting, Cuff Size: Adult Regular)   Pulse 89   Temp 98.7  F (37.1  C) (Oral)   Resp 16   Ht 1.73 m (5' 8.11\")   Wt 79.3 kg (174 lb 14.4 oz)   SpO2 97%   BMI 26.51 kg/m   Estimated body mass index is 26.51 kg/m  as calculated from the following:    Height as of this encounter: 1.73 m (5' 8.11\").    Weight as of this encounter: 79.3 kg (174 lb 14.4 oz). Body surface area is 1.95 meters squared.  No Pain (0) Comment: Data Unavailable   No LMP for male patient.  Allergies reviewed: Yes  Medications reviewed: Yes    Medications: MEDICATION REFILLS NEEDED TODAY. Provider was notified.  Pharmacy name entered into [employer]: [facility] - [place], [place] - [address]    Clinical concerns: Pt requesting refill on the Gleevec.  Dr. Bermudez was notified.      Mary Luther CMA              "

## 2019-10-08 NOTE — NURSING NOTE
Flu shot given IM left deltoid and patient tolerated without incident, VIS given.  All questions answered.

## 2019-10-10 ENCOUNTER — TELEPHONE (OUTPATIENT)
Dept: ONCOLOGY | Facility: CLINIC | Age: 34
End: 2019-10-10

## 2019-10-10 DIAGNOSIS — R79.0 LOW SERUM PHOSPHORUS FOR AGE: Primary | ICD-10-CM

## 2019-10-10 NOTE — TELEPHONE ENCOUNTER
I called pt's cell# and left a detailed message: Per Dr Bermudez, pt's labs 10/8 show low phosphorus (1.8). Pt to take oral phosphorus supplemements (packets) for the next 24 hours (plain Neutraphos - Take 4 packets now, then 8 hours later take 3 packets, then 8 hours later take 3 packets, then stop). RX sent to [facility] (listed as a preferred pharmacy) for patient . Advised pt to check labs again in 1 week, provided [facility]# for call back as needed. Will also send [place] message.          ---- Message -----  From: Sonia Bermudez MD  Sent: 10/10/2019   4:36 PM CDT  To: Sabrina Peter, RN, Aleksandr Hooks [facility], *  Subject: RE: low phos                                     Thanks!    Sabrina, do you mind putting this through and calling him?    Sonia  ----- Message -----  From: Aleksandr Hooks [facility]  Sent: 10/10/2019   1:15 PM CDT  To: Sonia Bermudez MD, Sabrina Peter, RN, *  Subject: RE: low phos                                     Hi    We probably should do a replacement over 24 hours.  For a phos of 1.8,  We could replace at a dose of 1mm/kg which would be 80 mm over 1 day or a total of 3 doses.  If we use plain neutraphos (250 mg), each 250 mg packet has 8 mm of phos in it.  So I would do 4 packets (1000 mg) for the first dose which would contain 32 mm of phos.  Then in 6-8 hours, 3 packets (750mg) which contains 24 mm of phos. I would then repeat this in 6-8 hours for 1 last dose. So he would get 32 mm + 24 mm + 24 mm for a total of 80 mm in 24 hours. (or 4 packets plus 3 packets + 3 packets)  I would then plan on repeating a phos level in the up coming week.    When ordering, there are various products.  Neutraphos, neutraphos K.   The neutra phos K has potassium (7.2 meq in each packet) and plain neutraphos has 1.1 mEq of potassium in it.    Thanks  Walt Hooks, PharmD, BCOP  October 10, 2019     ----- Message -----  From: Sonia Bermudez MD  Sent: 10/10/2019  12:04 PM CDT  To: Sabrina Peter RN,  [facility]  Subject: low phos                                         Easton's phos was a bit low. Does oral chemo have a suggestion on phos replacement for this? He is young and healthy so this is a bit odd.    Look forward to thoughts and then when we get a recommendation, Sabrina, could you call him and put through a script for the replacement? I am thinking low dose daily may be the easiest.    Thanks all     judy

## 2019-10-11 LAB — COPATH REPORT: NORMAL

## 2019-10-14 LAB — COPATH REPORT: NORMAL

## 2019-10-16 ENCOUNTER — TELEPHONE (OUTPATIENT)
Dept: ONCOLOGY | Facility: CLINIC | Age: 34
End: 2019-10-16

## 2019-10-16 NOTE — ORAL ONC MGMT
Oral Chemotherapy Monitoring Program  Primary Oncologist: Dr. Bermudez  Primary Oncology Clinic: [facility]  Cancer Diagnosis: CML      Drug: Gleevec 400mg tablets.  Previous Cycle Start Date: approximately 6/7/2018  Dose is appropriate for patient.  Expected duration of therapy: Until disease progression or unacceptable toxicity      Drug Interaction Assessment: No drug interactions found at this time. He takes no other medications.      Lab Monitoring Plan  CBC weekly for first month, then Q2 weeks for second month, then monthly  CMP monthly  Mag monthly  Phos monthly    Subjective/Objective:  Easton Pardo is a 34 year old male contacted by phone for a follow-up visit for oral chemotherapy.  He confirmed the correct dosing. He takes 3 tabs with dinner and then 1 tab ~an hour later. This helps reduce nausea. Patient denies any new/worsening side effects or medication changes. He missed about 3 doses above a month ago due to moving and being busy with school but feels he is back on track now.     ORAL CHEMOTHERAPY 6/21/2018 8/7/2018 11/16/2018 12/18/2018 7/8/2019 10/16/2019   Drug Name Gleevec (Imatinib) Gleevec (Imatinib) Gleevec (Imatinib) Gleevec (Imatinib) Gleevec (Imatinib) Gleevec (Imatinib)   Current Dosage - 200mg 400mg 400mg 400mg 400mg   Current Schedule Daily BID Daily Daily Daily Daily   Cycle Details Continuous Continuous Continuous Continuous Continuous Continuous   Start Date of Last Cycle 6/7/2018 - - - - 9/23/2019   Planned next cycle start date - - - - - 10/23/2019   Doses missed in last 2 weeks - 0 0 0 1 3   Adherence Assessment - Adherent Adherent Adherent Adherent Non-adherent   Reason for Non-adherence - - - - - Other   Adherence Intervention Recommended - - - - - Alarm   Adverse Effects Nausea No AE identified during assessment No AE identified during assessment No AE identified during assessment No AE identified during assessment No AE identified during assessment  "  Pharmacist Intervention(nausea) Yes - - - - -   Intervention(s) Referral to oncology provider - - - - -   Any new drug interactions? No - - - No No   Is the dose as ordered appropriate for the patient? - - - - Yes Yes   Has the patient been assessed within the past 6 months for depression? - - - - - Yes   Has the patient missed any days of school, work, or other routine activity? - - - - No No       Vitals:  BP:   BP Readings from Last 1 Encounters:   10/08/19 118/69     Wt Readings from Last 1 Encounters:   10/08/19 79.3 kg (174 lb 14.4 oz)     Estimated body surface area is 1.95 meters squared as calculated from the following:    Height as of 10/8/19: 1.73 m (5' 8.11\").    Weight as of 10/8/19: 79.3 kg (174 lb 14.4 oz).    Labs:  _  Result Component Current Result Ref Range   Sodium 136 (10/8/2019) 133 - 144 mmol/L     _  Result Component Current Result Ref Range   Potassium 4.2 (10/8/2019) 3.4 - 5.3 mmol/L     _  Result Component Current Result Ref Range   Calcium 8.6 (10/8/2019) 8.5 - 10.1 mg/dL     _  Result Component Current Result Ref Range   Magnesium 2.2 (10/8/2019) 1.6 - 2.3 mg/dL     _  Result Component Current Result Ref Range   Phosphorus 1.8 (L) (10/8/2019) 2.5 - 4.5 mg/dL     _  Result Component Current Result Ref Range   Albumin 4.1 (10/8/2019) 3.4 - 5.0 g/dL     _  Result Component Current Result Ref Range   Urea Nitrogen 10 (10/8/2019) 7 - 30 mg/dL     _  Result Component Current Result Ref Range   Creatinine 0.84 (10/8/2019) 0.66 - 1.25 mg/dL       _  Result Component Current Result Ref Range   AST 22 (10/8/2019) 0 - 45 U/L     _  Result Component Current Result Ref Range   ALT 27 (10/8/2019) 0 - 70 U/L     _  Result Component Current Result Ref Range   Bilirubin Total 0.5 (10/8/2019) 0.2 - 1.3 mg/dL       _  Result Component Current Result Ref Range   WBC 4.4 (10/8/2019) 4.0 - 11.0 10e9/L     _  Result Component Current Result Ref Range   Hemoglobin 14.1 (10/8/2019) 13.3 - 17.7 g/dL "     _  Result Component Current Result Ref Range   Platelet Count 187 (10/8/2019) 150 - 450 10e9/L     _  Result Component Current Result Ref Range   Absolute Neutrophil 1.7 (10/8/2019) 1.6 - 8.3 10e9/L       Assessment:  Radha is tolerating gleevec well.    Plan:  Continue gleevec 400mg daily    Follow-Up:  1/15: quarterly follow up    Refill Due:  Gleevec to deliver from [facility] on 10/18    Medardo Flores, PharmD, MS  Hematology/Oncology Clinical Pharmacist  [facility]  [facility]  [phone]

## 2019-10-18 DIAGNOSIS — R79.0 LOW SERUM PHOSPHORUS FOR AGE: ICD-10-CM

## 2019-10-18 DIAGNOSIS — C92.10 CML (CHRONIC MYELOCYTIC LEUKEMIA) (H): ICD-10-CM

## 2019-10-18 LAB
ALBUMIN SERPL-MCNC: 4.2 G/DL (ref 3.4–5)
ALP SERPL-CCNC: 66 U/L (ref 40–150)
ALT SERPL W P-5'-P-CCNC: 30 U/L (ref 0–70)
ANION GAP SERPL CALCULATED.3IONS-SCNC: 6 MMOL/L (ref 3–14)
AST SERPL W P-5'-P-CCNC: 21 U/L (ref 0–45)
BASOPHILS # BLD AUTO: 0 10E9/L (ref 0–0.2)
BASOPHILS NFR BLD AUTO: 0.4 %
BILIRUB SERPL-MCNC: 0.8 MG/DL (ref 0.2–1.3)
BUN SERPL-MCNC: 10 MG/DL (ref 7–30)
CALCIUM SERPL-MCNC: 8.9 MG/DL (ref 8.5–10.1)
CHLORIDE SERPL-SCNC: 103 MMOL/L (ref 94–109)
CO2 SERPL-SCNC: 26 MMOL/L (ref 20–32)
CREAT SERPL-MCNC: 0.82 MG/DL (ref 0.66–1.25)
DIFFERENTIAL METHOD BLD: NORMAL
EOSINOPHIL # BLD AUTO: 0 10E9/L (ref 0–0.7)
EOSINOPHIL NFR BLD AUTO: 0.6 %
ERYTHROCYTE [DISTWIDTH] IN BLOOD BY AUTOMATED COUNT: 13 % (ref 10–15)
GFR SERPL CREATININE-BSD FRML MDRD: >90 ML/MIN/{1.73_M2}
GLUCOSE SERPL-MCNC: 89 MG/DL (ref 70–99)
HCT VFR BLD AUTO: 40.4 % (ref 40–53)
HGB BLD-MCNC: 14.1 G/DL (ref 13.3–17.7)
IMM GRANULOCYTES # BLD: 0 10E9/L (ref 0–0.4)
IMM GRANULOCYTES NFR BLD: 0.2 %
LYMPHOCYTES # BLD AUTO: 2.9 10E9/L (ref 0.8–5.3)
LYMPHOCYTES NFR BLD AUTO: 59 %
MCH RBC QN AUTO: 31.8 PG (ref 26.5–33)
MCHC RBC AUTO-ENTMCNC: 34.9 G/DL (ref 31.5–36.5)
MCV RBC AUTO: 91 FL (ref 78–100)
MONOCYTES # BLD AUTO: 0.4 10E9/L (ref 0–1.3)
MONOCYTES NFR BLD AUTO: 7.5 %
NEUTROPHILS # BLD AUTO: 1.6 10E9/L (ref 1.6–8.3)
NEUTROPHILS NFR BLD AUTO: 32.3 %
NRBC # BLD AUTO: 0 10*3/UL
NRBC BLD AUTO-RTO: 0 /100
PHOSPHATE SERPL-MCNC: 2.3 MG/DL (ref 2.5–4.5)
PLATELET # BLD AUTO: 190 10E9/L (ref 150–450)
POTASSIUM SERPL-SCNC: 4.3 MMOL/L (ref 3.4–5.3)
PROT SERPL-MCNC: 7 G/DL (ref 6.8–8.8)
RBC # BLD AUTO: 4.43 10E12/L (ref 4.4–5.9)
SODIUM SERPL-SCNC: 136 MMOL/L (ref 133–144)
WBC # BLD AUTO: 4.8 10E9/L (ref 4–11)

## 2019-10-18 PROCEDURE — 84100 ASSAY OF PHOSPHORUS: CPT | Performed by: INTERNAL MEDICINE

## 2019-10-18 PROCEDURE — 80053 COMPREHEN METABOLIC PANEL: CPT | Performed by: INTERNAL MEDICINE

## 2019-10-18 PROCEDURE — 85025 COMPLETE CBC W/AUTO DIFF WBC: CPT | Performed by: INTERNAL MEDICINE

## 2019-10-22 ENCOUNTER — TELEPHONE (OUTPATIENT)
Dept: ONCOLOGY | Facility: CLINIC | Age: 34
End: 2019-10-22

## 2019-10-22 DIAGNOSIS — R79.0 LOW SERUM PHOSPHORUS FOR AGE: Primary | ICD-10-CM

## 2019-10-22 NOTE — TELEPHONE ENCOUNTER
Easton had labs drawn 10/18 for follow-up regarding hypophosphatemia. His re-check showed borderline low phos (2.3, low norm is 2.5). Called to discuss; per Margot Felipe PA-C she Rx 2 packets phos daily for 7 days with repeat labs in 2 weeks. He is unable to  the packets until next week, guessing it will be early next week before Wednesday, 10/30. We discussed how he takes the packets; with water, 2 packets at once with a meal to decrease GI irritation, and once within 24 hrs due to erratic schedule (he won't be able to take it at a consistent time). Repeated that he should get labs in 2 weeks after starting the phosphorus replacements.

## 2019-12-26 DIAGNOSIS — C92.10 CML (CHRONIC MYELOCYTIC LEUKEMIA) (H): ICD-10-CM

## 2020-01-30 ENCOUNTER — TELEPHONE (OUTPATIENT)
Dept: ONCOLOGY | Facility: CLINIC | Age: 35
End: 2020-01-30

## 2020-01-30 NOTE — TELEPHONE ENCOUNTER
Easton called with concerns he received a letter he would be started on [employer] since he did not submit insurance paperwork before the end of the year. He submitted paperwork to start [employer] several weeks ago and is waiting to hear back from them but would like to know what to do in the meantime regarding his upcoming appt. Advised him to keep the appt since he has paperwork ensuring he has insurance through [employer] and if he would like to bill through [employer] he can file a retroactive claim.     He was also concerned about receiving a 's notice about a co-pay not received from a previous appt. He wasn't aware of missing a co-pay and was concerned about being in debt. Encouraged him to call the billing office and work with them to repay or notify them of the circumstances; they may be able to reduce the bill or negate the late fees. Acknowledged the plan to call billing and come to clinic for his appt on 2/6 with Dr. Sonia Bermudez- can bill either [employer] of [employer].

## 2020-02-06 ENCOUNTER — APPOINTMENT (OUTPATIENT)
Dept: LAB | Facility: CLINIC | Age: 35
End: 2020-02-06
Attending: INTERNAL MEDICINE
Payer: MEDICAID

## 2020-02-06 ENCOUNTER — OFFICE VISIT (OUTPATIENT)
Dept: TRANSPLANT | Facility: CLINIC | Age: 35
End: 2020-02-06
Attending: INTERNAL MEDICINE
Payer: MEDICAID

## 2020-02-06 VITALS
HEART RATE: 75 BPM | SYSTOLIC BLOOD PRESSURE: 116 MMHG | BODY MASS INDEX: 26.96 KG/M2 | DIASTOLIC BLOOD PRESSURE: 78 MMHG | OXYGEN SATURATION: 99 % | WEIGHT: 177.9 LBS | TEMPERATURE: 97.5 F | RESPIRATION RATE: 16 BRPM

## 2020-02-06 DIAGNOSIS — C92.10 CML (CHRONIC MYELOCYTIC LEUKEMIA) (H): ICD-10-CM

## 2020-02-06 DIAGNOSIS — R79.0 LOW SERUM PHOSPHORUS FOR AGE: ICD-10-CM

## 2020-02-06 LAB
ALBUMIN SERPL-MCNC: 4.4 G/DL (ref 3.4–5)
ALP SERPL-CCNC: 79 U/L (ref 40–150)
ALT SERPL W P-5'-P-CCNC: 30 U/L (ref 0–70)
ANION GAP SERPL CALCULATED.3IONS-SCNC: 2 MMOL/L (ref 3–14)
AST SERPL W P-5'-P-CCNC: 18 U/L (ref 0–45)
BASOPHILS # BLD AUTO: 0 10E9/L (ref 0–0.2)
BASOPHILS NFR BLD AUTO: 0.2 %
BILIRUB SERPL-MCNC: 0.4 MG/DL (ref 0.2–1.3)
BUN SERPL-MCNC: 10 MG/DL (ref 7–30)
CALCIUM SERPL-MCNC: 9.3 MG/DL (ref 8.5–10.1)
CHLORIDE SERPL-SCNC: 107 MMOL/L (ref 94–109)
CO2 SERPL-SCNC: 28 MMOL/L (ref 20–32)
CREAT SERPL-MCNC: 0.73 MG/DL (ref 0.66–1.25)
DIFFERENTIAL METHOD BLD: NORMAL
EOSINOPHIL # BLD AUTO: 0 10E9/L (ref 0–0.7)
EOSINOPHIL NFR BLD AUTO: 0.4 %
ERYTHROCYTE [DISTWIDTH] IN BLOOD BY AUTOMATED COUNT: 13.2 % (ref 10–15)
GFR SERPL CREATININE-BSD FRML MDRD: >90 ML/MIN/{1.73_M2}
GLUCOSE SERPL-MCNC: 91 MG/DL (ref 70–99)
HCT VFR BLD AUTO: 40.9 % (ref 40–53)
HGB BLD-MCNC: 14 G/DL (ref 13.3–17.7)
IMM GRANULOCYTES # BLD: 0 10E9/L (ref 0–0.4)
IMM GRANULOCYTES NFR BLD: 0 %
LYMPHOCYTES # BLD AUTO: 2.4 10E9/L (ref 0.8–5.3)
LYMPHOCYTES NFR BLD AUTO: 44.6 %
MAGNESIUM SERPL-MCNC: 2.2 MG/DL (ref 1.6–2.3)
MCH RBC QN AUTO: 31.7 PG (ref 26.5–33)
MCHC RBC AUTO-ENTMCNC: 34.2 G/DL (ref 31.5–36.5)
MCV RBC AUTO: 93 FL (ref 78–100)
MONOCYTES # BLD AUTO: 0.4 10E9/L (ref 0–1.3)
MONOCYTES NFR BLD AUTO: 8.1 %
NEUTROPHILS # BLD AUTO: 2.5 10E9/L (ref 1.6–8.3)
NEUTROPHILS NFR BLD AUTO: 46.7 %
NRBC # BLD AUTO: 0 10*3/UL
NRBC BLD AUTO-RTO: 0 /100
PHOSPHATE SERPL-MCNC: 2.3 MG/DL (ref 2.5–4.5)
PLATELET # BLD AUTO: 195 10E9/L (ref 150–450)
POTASSIUM SERPL-SCNC: 4.5 MMOL/L (ref 3.4–5.3)
PROT SERPL-MCNC: 7.4 G/DL (ref 6.8–8.8)
RBC # BLD AUTO: 4.42 10E12/L (ref 4.4–5.9)
SODIUM SERPL-SCNC: 137 MMOL/L (ref 133–144)
WBC # BLD AUTO: 5.3 10E9/L (ref 4–11)

## 2020-02-06 PROCEDURE — 81206 BCR/ABL1 GENE MAJOR BP: CPT | Performed by: INTERNAL MEDICINE

## 2020-02-06 PROCEDURE — 85025 COMPLETE CBC W/AUTO DIFF WBC: CPT | Performed by: INTERNAL MEDICINE

## 2020-02-06 PROCEDURE — 80053 COMPREHEN METABOLIC PANEL: CPT | Performed by: INTERNAL MEDICINE

## 2020-02-06 PROCEDURE — 84100 ASSAY OF PHOSPHORUS: CPT | Performed by: INTERNAL MEDICINE

## 2020-02-06 PROCEDURE — G0463 HOSPITAL OUTPT CLINIC VISIT: HCPCS

## 2020-02-06 PROCEDURE — 83735 ASSAY OF MAGNESIUM: CPT | Performed by: INTERNAL MEDICINE

## 2020-02-06 PROCEDURE — 36415 COLL VENOUS BLD VENIPUNCTURE: CPT

## 2020-02-06 PROCEDURE — 81207 BCR/ABL1 GENE MINOR BP: CPT | Performed by: INTERNAL MEDICINE

## 2020-02-06 ASSESSMENT — PAIN SCALES - GENERAL: PAINLEVEL: NO PAIN (0)

## 2020-02-06 NOTE — PROGRESS NOTES
[facility] Follow-up Visit  Feb 6, 2020      Disease and Treatment History:  1. Presented in 10/2009 with abdominal fullness, profound fatigue, SOB and found to have splenomegaly, elevated WBC, anemia, and retained platelet count  2. 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 Imatinib started Fall 2009  -- CBC quickly normalized  -- Cytogenetic remission obtained (unclear date)  -- Major molecular response as of 2011  -- Molecular remission noted in 6/2012  4. Transfer of care to [facility] in 10/2012 and has been followed there until 6/2017. Waxing and waning p210 and p190 levels all < 0.006% on the IS. Last checked there on 6/2017:  -- P210 0.001% IS  -- P190: 0.0001%  5. Moved to [place] 6/2017  6. 6/2019 BCR-ABL p190 undetectable; BCR-ABL p210 <0.043%    HPI: Mohan comes in today for CML follow-up. He has been taking Gleevec at 400 mg daily (splitting to 300mg and 100mg with 30 mins apart).I last saw him in 10/2019. Since then he notes he is doing well. Has had issues with his cat's health. 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 No fevers or chills, no chest pain or SOB, no GI symptoms, no weakness.     10 point ROS otherwise negative    Physical Exam:  /78 (BP Location: Right arm, Patient Position: Sitting, Cuff Size: Adult Regular)   Pulse 75   Temp 97.5  F (36.4  C) (Oral)   Resp 16   Wt 80.7 kg (177 lb 14.4 oz)   SpO2 99%   BMI 26.96 kg/m    Gen: Well appearing.   HEENt: OP clear, no thrush, no GENESIS  Lungs: CTAB no crackles or wheezes  CV: RRR no r/m/g  Abd: Soft, NT/ND, no HSM  Ext: No edema    Labs:  Results for MOHAN GARCIA (MRN [id number]) as of 2/6/2020 13:59   Ref. Range 2/6/2020 13:23   WBC Latest Ref Range: 4.0 - 11.0 10e9/L 5.3   Hemoglobin Latest Ref Range: 13.3 - 17.7 g/dL 14.0   Hematocrit Latest Ref Range: 40.0 - 53.0 % 40.9   Platelet Count Latest Ref Range: 150 - 450 10e9/L 195   RBC Count Latest  Ref Range: 4.4 - 5.9 10e12/L 4.42   MCV Latest Ref Range: 78 - 100 fl 93   MCH Latest Ref Range: 26.5 - 33.0 pg 31.7   MCHC Latest Ref Range: 31.5 - 36.5 g/dL 34.2   RDW Latest Ref Range: 10.0 - 15.0 % 13.2   Diff Method Unknown Automated Method   % Neutrophils Latest Units: % 46.7   % Lymphocytes Latest Units: % 44.6   % Monocytes Latest Units: % 8.1   % Eosinophils Latest Units: % 0.4   % Basophils Latest Units: % 0.2   % Immature Granulocytes Latest Units: % 0.0   Nucleated RBCs Latest Ref Range: 0 /100 0   Absolute Neutrophil Latest Ref Range: 1.6 - 8.3 10e9/L 2.5   Absolute Lymphocytes Latest Ref Range: 0.8 - 5.3 10e9/L 2.4   Absolute Monocytes Latest Ref Range: 0.0 - 1.3 10e9/L 0.4   Absolute Eosinophils Latest Ref Range: 0.0 - 0.7 10e9/L 0.0   Absolute Basophils Latest Ref Range: 0.0 - 0.2 10e9/L 0.0   Abs Immature Granulocytes Latest Ref Range: 0 - 0.4 10e9/L 0.0   Absolute Nucleated RBC Unknown 0.0     10/14/2019:  BCR-ABL p190 negative  BCR-ABL p210 < 0.034% (down from 0.043% at last check in 6/2019).    BCR-ABL P210 and P190 pending    A/P: 34 year old man with CP CML since 2009 with molecular remission on imatinib.    1. CML: Currently on imatinib 400 mg daily (splitting to 300mg and 100mg with 30 mins apart due to nausea). Repeat P210 and P190 pending today. He notes good compliance with no missed doses.    2. Heme: Good counts    3. ID: No infectious issues  - flu shot in 10/2019    4. Renal: Normal renal function    5. GI: No liver issues. Mild nausea sometimes with the imatinib    6. Hypophosphatemia: intermittent need for phos repletion. I am starting him on twice weekly neutraphos to try to stabilize this better    Final Plan:  - Follow-up BCR-ABL  - Lara 4 months    Sonia Bermudez MD    Addendum 2/11/2020:  BCR-ABL1/ABL1 Major(p210):      UNDETECTABLE     BCR Abl P190    Will jonht Easton with the results    Sonia Bermudez MD

## 2020-02-07 DIAGNOSIS — C92.10 CML (CHRONIC MYELOCYTIC LEUKEMIA) (H): Primary | ICD-10-CM

## 2020-02-07 RX ORDER — IMATINIB MESYLATE 100 MG/1
400 TABLET, FILM COATED ORAL DAILY
Qty: 120 TABLET | Refills: 3 | Status: SHIPPED | OUTPATIENT
Start: 2020-02-07 | End: 2020-05-01

## 2020-02-10 ENCOUNTER — TELEPHONE (OUTPATIENT)
Dept: ONCOLOGY | Facility: CLINIC | Age: 35
End: 2020-02-10

## 2020-02-10 LAB — COPATH REPORT: NORMAL

## 2020-02-10 NOTE — ORAL ONC MGMT
Oral Chemotherapy Monitoring Program     Placed call to patient 2/10 and 2/13 in follow up of oral chemotherapy. Left message requesting call back. No drug names were mentioned. Will update when response received.     Aleyda Vidal, PharmD, BCOP  Hematology/Oncology Clinical Pharmacist  [facility]  [facility]

## 2020-02-11 ENCOUNTER — MYC MEDICAL ADVICE (OUTPATIENT)
Dept: TRANSPLANT | Facility: CLINIC | Age: 35
End: 2020-02-11

## 2020-02-13 LAB — COPATH REPORT: NORMAL

## 2020-02-17 ENCOUNTER — TELEPHONE (OUTPATIENT)
Dept: ONCOLOGY | Facility: CLINIC | Age: 35
End: 2020-02-17

## 2020-02-17 NOTE — ORAL ONC MGMT
Oral Chemotherapy Monitoring Program      Placed call to patient 2/17 in follow up of oral chemotherapy. Left message requesting call back. No drug names or patient names were mentioned. Will update when response received.       Thank you for the opportunity to participate in the care of the above patient,  Sagra Wright, PharmD  Hematology/Oncology Clinical Pharmacist  [facility]  [facility]  [phone]

## 2020-03-11 ENCOUNTER — HEALTH MAINTENANCE LETTER (OUTPATIENT)
Age: 35
End: 2020-03-11

## 2020-03-13 NOTE — TELEPHONE ENCOUNTER
Pt called inquiring about the process for being tested for Covid19 given his Sx. Discussed the process ([email or web address].org) for being evaluated for eligibility. Pt verbalized understanding and stated this was the exact information he was seeking.  Pt then discuss that last night he was shooing his cat away from something and it bit or scratched him on his hand. Pt stated that it was a fairly deep puncture that bled a bit. No bleeding or Sx on infection today, but wanted to know if this was an issue. Pt notes he is on Gleevec. Oral Chemo doesn't note any additional concerns for a Pt on this medication with his lab results. Discussed with Sabrina GANT RNCC. Pt should Tx with standard wound care and observe for Sx of infection. If Pt has concerns about Abx or Tetanus then should see a PCP or could go to UC or NP clinic. Pt verbalized understanding and appreciation.

## 2020-04-27 ENCOUNTER — TELEPHONE (OUTPATIENT)
Dept: ONCOLOGY | Facility: CLINIC | Age: 35
End: 2020-04-27

## 2020-04-27 NOTE — ORAL ONC MGMT
Oral Chemotherapy Monitoring Program     Placed call to patient on 4/27 and 4/30 in follow up of oral chemotherapy. Left message requesting call back. No drug names were mentioned. Will update when response received.     Medardo Flores, PharmD, MS  Hematology/Oncology Clinical Pharmacist  [facility]  [facility]

## 2020-04-30 DIAGNOSIS — C92.10 CML (CHRONIC MYELOCYTIC LEUKEMIA) (H): Primary | ICD-10-CM

## 2020-05-01 DIAGNOSIS — C92.10 CML (CHRONIC MYELOCYTIC LEUKEMIA) (H): Primary | ICD-10-CM

## 2020-05-01 RX ORDER — IMATINIB MESYLATE 100 MG/1
400 TABLET, FILM COATED ORAL DAILY
Qty: 120 TABLET | Refills: 3 | Status: SHIPPED | OUTPATIENT
Start: 2020-05-01 | End: 2020-09-03

## 2020-05-12 ENCOUNTER — TELEPHONE (OUTPATIENT)
Dept: ONCOLOGY | Facility: CLINIC | Age: 35
End: 2020-05-12

## 2020-05-12 NOTE — ORAL ONC MGMT
Oral Chemotherapy Monitoring Program    Patient scheduled imatinib delivery with [facility] 5/8.    Adherence: PDC=0.9, 11 refills over past 12 months.     Sagar Wright, PharmD  Hematology/Oncology Clinical Pharmacist  [facility]  [facility]  [phone]

## 2020-07-07 ENCOUNTER — TELEPHONE (OUTPATIENT)
Dept: ONCOLOGY | Facility: CLINIC | Age: 35
End: 2020-07-07

## 2020-07-07 NOTE — TELEPHONE ENCOUNTER
Oral Chemotherapy Monitoring Program  Primary Oncologist: Dr. Bermudez  Primary Oncology Clinic: [facility]  Cancer Diagnosis: CML      Drug: Gleevec 400mg tablets.  Previous Cycle Start Date: approximately 6/7/2018  Dose is appropriate for patient.  Expected duration of therapy: Until disease progression or unacceptable toxicity      Drug Interaction Assessment: No drug interactions found at this time. He takes no other medications.      Lab Monitoring Plan  CBC weekly for first month, then Q2 weeks for second month, then monthly  CMP monthly  Mag monthly  Phos monthly     Subjective/Objective:  Easton Pardo is a 34 year old male contacted by phone for a follow-up visit for oral chemotherapy.  He confirmed the correct dosing. Patient denies any new/worsening side effects or medication changes. He mentioned taking 3 tablets versus 4 tablets a month or so ago twice due to taking some ibuprofen because he said there is a risk for bleeding and acetaminophen due to the drug interaction with his Gleevec.      ORAL CHEMOTHERAPY 6/21/2018 8/7/2018 11/16/2018 12/18/2018 7/8/2019 10/16/2019 7/7/2020   Drug Name Gleevec (Imatinib) Gleevec (Imatinib) Gleevec (Imatinib) Gleevec (Imatinib) Gleevec (Imatinib) Gleevec (Imatinib) Gleevec (Imatinib)   Current Dosage - 200mg 400mg 400mg 400mg 400mg 400mg   Current Schedule Daily BID Daily Daily Daily Daily Daily   Cycle Details Continuous Continuous Continuous Continuous Continuous Continuous Continuous   Start Date of Last Cycle 6/7/2018 - - - - 9/23/2019 -   Planned next cycle start date - - - - - 10/23/2019 -   Doses missed in last 2 weeks - 0 0 0 1 3 0   Adherence Assessment - Adherent Adherent Adherent Adherent Non-adherent Adherent   Reason for Non-adherence - - - - - Other -   Adherence Intervention Recommended - - - - - Alarm -   Adverse Effects Nausea No AE identified during assessment No AE identified during assessment No AE identified during assessment  "No AE identified during assessment No AE identified during assessment No AE identified during assessment   Pharmacist Intervention(nausea) Yes - - - - - -   Intervention(s) Referral to oncology provider - - - - - -   Any new drug interactions? No - - - No No No   Is the dose as ordered appropriate for the patient? - - - - Yes Yes Yes   Has the patient been assessed within the past 6 months for depression? - - - - - Yes -   Has the patient missed any days of school, work, or other routine activity? - - - - No No No       Vitals:  BP:   BP Readings from Last 1 Encounters:   02/06/20 116/78     Wt Readings from Last 1 Encounters:   02/06/20 80.7 kg (177 lb 14.4 oz)     Estimated body surface area is 1.97 meters squared as calculated from the following:    Height as of 10/8/19: 1.73 m (5' 8.11\").    Weight as of 2/6/20: 80.7 kg (177 lb 14.4 oz).    Labs:  No results found for NA within last 30 days.     No results found for K within last 30 days.     No results found for CA within last 30 days.     No results found for Mag within last 30 days.     No results found for Phos within last 30 days.     No results found for ALBUMIN within last 30 days.     No results found for BUN within last 30 days.     No results found for CR within last 30 days.       No results found for AST within last 30 days.     No results found for ALT within last 30 days.     No results found for BILITOTAL within last 30 days.       No results found for WBC within last 30 days.     No results found for HGB within last 30 days.     No results found for PLT within last 30 days.     No results found for ANC within last 30 days.     Assessment:  Radha is tolerating gleevec well.     Plan:  Continue gleevec 400mg daily     Follow-Up:  8/3 Labs  8/18 [facility] appointment  10/5: quarterly follow up     Refill Due:  Gleevec to deliver from [facility] later this week arranged with technician.    Kika Veliz, PharmD  PGY2 Oncology Pharmacy Resident   [facility]  [facility]  [phone]

## 2020-07-07 NOTE — TELEPHONE ENCOUNTER
Oral Chemotherapy Monitoring Program     Placed call on 7/3 and 7/7 to patient in follow up of oral chemotherapy. Left messages requesting call back. No drug names were mentioned. Will update when response received.     Kika Veliz, PharmD  PGY2 Oncology Pharmacy Resident   [facility]  [facility]

## 2020-08-03 VITALS
OXYGEN SATURATION: 97 % | HEART RATE: 100 BPM | WEIGHT: 146.1 LBS | TEMPERATURE: 97.4 F | DIASTOLIC BLOOD PRESSURE: 71 MMHG | BODY MASS INDEX: 22.14 KG/M2 | RESPIRATION RATE: 18 BRPM | SYSTOLIC BLOOD PRESSURE: 115 MMHG

## 2020-08-03 DIAGNOSIS — C92.10 CML (CHRONIC MYELOCYTIC LEUKEMIA) (H): ICD-10-CM

## 2020-08-03 LAB
ALBUMIN SERPL-MCNC: 4.3 G/DL (ref 3.4–5)
ALP SERPL-CCNC: 80 U/L (ref 40–150)
ALT SERPL W P-5'-P-CCNC: 44 U/L (ref 0–70)
ANION GAP SERPL CALCULATED.3IONS-SCNC: 6 MMOL/L (ref 3–14)
AST SERPL W P-5'-P-CCNC: 29 U/L (ref 0–45)
BASOPHILS # BLD AUTO: 0 10E9/L (ref 0–0.2)
BASOPHILS NFR BLD AUTO: 0.5 %
BILIRUB SERPL-MCNC: 0.7 MG/DL (ref 0.2–1.3)
BUN SERPL-MCNC: 13 MG/DL (ref 7–30)
CALCIUM SERPL-MCNC: 9.2 MG/DL (ref 8.5–10.1)
CHLORIDE SERPL-SCNC: 105 MMOL/L (ref 94–109)
CO2 SERPL-SCNC: 27 MMOL/L (ref 20–32)
CREAT SERPL-MCNC: 0.82 MG/DL (ref 0.66–1.25)
DIFFERENTIAL METHOD BLD: NORMAL
EOSINOPHIL # BLD AUTO: 0 10E9/L (ref 0–0.7)
EOSINOPHIL NFR BLD AUTO: 0.4 %
ERYTHROCYTE [DISTWIDTH] IN BLOOD BY AUTOMATED COUNT: 12.2 % (ref 10–15)
GFR SERPL CREATININE-BSD FRML MDRD: >90 ML/MIN/{1.73_M2}
GLUCOSE SERPL-MCNC: 85 MG/DL (ref 70–99)
HCT VFR BLD AUTO: 41.5 % (ref 40–53)
HGB BLD-MCNC: 14 G/DL (ref 13.3–17.7)
IMM GRANULOCYTES # BLD: 0 10E9/L (ref 0–0.4)
IMM GRANULOCYTES NFR BLD: 0.2 %
LYMPHOCYTES # BLD AUTO: 2.1 10E9/L (ref 0.8–5.3)
LYMPHOCYTES NFR BLD AUTO: 38.1 %
MCH RBC QN AUTO: 31.4 PG (ref 26.5–33)
MCHC RBC AUTO-ENTMCNC: 33.7 G/DL (ref 31.5–36.5)
MCV RBC AUTO: 93 FL (ref 78–100)
MONOCYTES # BLD AUTO: 0.5 10E9/L (ref 0–1.3)
MONOCYTES NFR BLD AUTO: 8.2 %
NEUTROPHILS # BLD AUTO: 2.9 10E9/L (ref 1.6–8.3)
NEUTROPHILS NFR BLD AUTO: 52.6 %
NRBC # BLD AUTO: 0 10*3/UL
NRBC BLD AUTO-RTO: 0 /100
PHOSPHATE SERPL-MCNC: 2.3 MG/DL (ref 2.5–4.5)
PLATELET # BLD AUTO: 196 10E9/L (ref 150–450)
POTASSIUM SERPL-SCNC: 4.5 MMOL/L (ref 3.4–5.3)
PROT SERPL-MCNC: 7.7 G/DL (ref 6.8–8.8)
RBC # BLD AUTO: 4.46 10E12/L (ref 4.4–5.9)
SODIUM SERPL-SCNC: 137 MMOL/L (ref 133–144)
WBC # BLD AUTO: 5.5 10E9/L (ref 4–11)

## 2020-08-03 PROCEDURE — 85025 COMPLETE CBC W/AUTO DIFF WBC: CPT | Performed by: INTERNAL MEDICINE

## 2020-08-03 PROCEDURE — 80053 COMPREHEN METABOLIC PANEL: CPT | Performed by: INTERNAL MEDICINE

## 2020-08-03 PROCEDURE — 81207 BCR/ABL1 GENE MINOR BP: CPT | Performed by: INTERNAL MEDICINE

## 2020-08-03 PROCEDURE — 81206 BCR/ABL1 GENE MAJOR BP: CPT | Performed by: INTERNAL MEDICINE

## 2020-08-03 PROCEDURE — 84100 ASSAY OF PHOSPHORUS: CPT | Performed by: INTERNAL MEDICINE

## 2020-08-03 ASSESSMENT — PAIN SCALES - GENERAL: PAINLEVEL: NO PAIN (0)

## 2020-08-03 NOTE — PROGRESS NOTES
Chief Complaint   Patient presents with     Blood Draw     lood drawn via VPT by DESTIN.      REGAN Foster LPN

## 2020-08-06 LAB — COPATH REPORT: NORMAL

## 2020-08-10 LAB — COPATH REPORT: NORMAL

## 2020-08-18 ENCOUNTER — VIRTUAL VISIT (OUTPATIENT)
Dept: TRANSPLANT | Facility: CLINIC | Age: 35
End: 2020-08-18
Attending: INTERNAL MEDICINE
Payer: COMMERCIAL

## 2020-08-18 DIAGNOSIS — C92.10 CML (CHRONIC MYELOCYTIC LEUKEMIA) (H): Primary | ICD-10-CM

## 2020-08-18 PROCEDURE — 40001009 ZZH VIDEO/TELEPHONE VISIT; NO CHARGE

## 2020-08-18 NOTE — LETTER
8/18/2020         RE: Mohan Pardo  [address]  [place] [address]        Dear Colleague,    Thank you for referring your patient, Mohan Pardo, to the [facility] BLOOD AND MARROW TRANSPLANT. Please see a copy of my visit note below.    [facility] Follow-up Phone Visit  Aug 18, 2020      Disease and Treatment History:  1. Presented in 10/2009 with abdominal fullness, profound fatigue, SOB and found to have splenomegaly, elevated WBC, anemia, and retained platelet count  2. 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 Imatinib started Fall 2009  -- CBC quickly normalized  -- Cytogenetic remission obtained (unclear date)  -- Major molecular response as of 2011  -- Molecular remission noted in 6/2012  4. Transfer of care to [facility] in 10/2012 and has been followed there until 6/2017. Waxing and waning p210 and p190 levels all < 0.006% on the IS. Last checked there on 6/2017:  -- P210 0.001% IS  -- P190: 0.0001%  5. Moved to [place] 6/2017  6. 6/2019 BCR-ABL p190 undetectable; BCR-ABL p210 <0.043%    HPI: I called Mohan today for a quick phone follow-up. 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 Notes no infections. Notes that he usually takes 400 mg daily but intermittently takes 300 mg. Otherwise is doing well.    10 point ROS otherwise negative        Labs:    Results for MOHAN PARDO (MRN [id number]) as of 8/18/2020 13:27   Ref. Range 8/3/2020 15:01   Sodium Latest Ref Range: 133 - 144 mmol/L 137   Potassium Latest Ref Range: 3.4 - 5.3 mmol/L 4.5   Chloride Latest Ref Range: 94 - 109 mmol/L 105   Carbon Dioxide Latest Ref Range: 20 - 32 mmol/L 27   Urea Nitrogen Latest Ref Range: 7 - 30 mg/dL 13   Creatinine Latest Ref Range: 0.66 - 1.25 mg/dL 0.82   GFR Estimate Latest Ref Range: >60 mL/min/1.73_m2 >90   GFR Estimate If Black Latest Ref Range: >60 mL/min/1.73_m2 >90   Calcium Latest  Ref Range: 8.5 - 10.1 mg/dL 9.2   Anion Gap Latest Ref Range: 3 - 14 mmol/L 6   Phosphorus Latest Ref Range: 2.5 - 4.5 mg/dL 2.3 (L)   Albumin Latest Ref Range: 3.4 - 5.0 g/dL 4.3   Protein Total Latest Ref Range: 6.8 - 8.8 g/dL 7.7   Bilirubin Total Latest Ref Range: 0.2 - 1.3 mg/dL 0.7   Alkaline Phosphatase Latest Ref Range: 40 - 150 U/L 80   ALT Latest Ref Range: 0 - 70 U/L 44   AST Latest Ref Range: 0 - 45 U/L 29   Glucose Latest Ref Range: 70 - 99 mg/dL 85   WBC Latest Ref Range: 4.0 - 11.0 10e9/L 5.5   Hemoglobin Latest Ref Range: 13.3 - 17.7 g/dL 14.0   Hematocrit Latest Ref Range: 40.0 - 53.0 % 41.5   Platelet Count Latest Ref Range: 150 - 450 10e9/L 196   RBC Count Latest Ref Range: 4.4 - 5.9 10e12/L 4.46   MCV Latest Ref Range: 78 - 100 fl 93   MCH Latest Ref Range: 26.5 - 33.0 pg 31.4   MCHC Latest Ref Range: 31.5 - 36.5 g/dL 33.7   RDW Latest Ref Range: 10.0 - 15.0 % 12.2   Diff Method Unknown Automated Method   % Neutrophils Latest Units: % 52.6   % Lymphocytes Latest Units: % 38.1   % Monocytes Latest Units: % 8.2   % Eosinophils Latest Units: % 0.4   % Basophils Latest Units: % 0.5   % Immature Granulocytes Latest Units: % 0.2   Nucleated RBCs Latest Ref Range: 0 /100 0   Absolute Neutrophil Latest Ref Range: 1.6 - 8.3 10e9/L 2.9   Absolute Lymphocytes Latest Ref Range: 0.8 - 5.3 10e9/L 2.1   Absolute Monocytes Latest Ref Range: 0.0 - 1.3 10e9/L 0.5   Absolute Eosinophils Latest Ref Range: 0.0 - 0.7 10e9/L 0.0   Absolute Basophils Latest Ref Range: 0.0 - 0.2 10e9/L 0.0   Abs Immature Granulocytes Latest Ref Range: 0 - 0.4 10e9/L 0.0   Absolute Nucleated RBC Unknown 0.0     10/14/2019:  BCR-ABL p190 negative  BCR-ABL p210 < 0.034% (down from 0.043% at last check in 6/2019).    2/2020   BCR-ABL P210: undetectable  BCR- ABL P190 undetectable    8/3/2020:   -- BCR-ABL p210: BCR-ABL1/ABL1 ratio of <0.039%.  --BCR-ABL: p190: undetectable    A/P: 34 year old man with CP CML since 2009 with molecular  remission on imatinib.    1. CML: Currently on imatinib 400 mg daily (splitting to 300mg and 100mg with 30 mins apart due to nausea). Repeat P210 and P190 pending today. He notes good compliance on daily dosing but sometimes only takes 300 mg. Encouraged him to take 400 mg daily given the mild detectability of the bcr-abp p210    2. Heme: Good counts    3. ID: No infectious issues  - flu shot in 10/2019    4. Renal: Normal renal function    5. GI: No liver issues. Mild nausea sometimes with the imatinib    6. Hypophosphatemia: intermittent need for phos repletion. I previously started him on twice weekly neutraphos to try to stabilize this better but he notes he stopped doing this. I encouraged him to restart given his mildly low phos    Final Plan:  - Follow-up BCR-ABL  - Lara 4 months    Sonia Bermudez MD          Again, thank you for allowing me to participate in the care of your patient.        Sincerely,        Sonia Bermudez MD

## 2020-08-18 NOTE — PROGRESS NOTES
[facility] Follow-up Phone Visit  Aug 18, 2020      Disease and Treatment History:  1. Presented in 10/2009 with abdominal fullness, profound fatigue, SOB and found to have splenomegaly, elevated WBC, anemia, and retained platelet count  2. 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 Imatinib started Fall 2009  -- CBC quickly normalized  -- Cytogenetic remission obtained (unclear date)  -- Major molecular response as of 2011  -- Molecular remission noted in 6/2012  4. Transfer of care to [facility] in 10/2012 and has been followed there until 6/2017. Waxing and waning p210 and p190 levels all < 0.006% on the IS. Last checked there on 6/2017:  -- P210 0.001% IS  -- P190: 0.0001%  5. Moved to [place] 6/2017  6. 6/2019 BCR-ABL p190 undetectable; BCR-ABL p210 <0.043%    HPI: I called Mohan today for a quick phone follow-up. 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 Notes no infections. Notes that he usually takes 400 mg daily but intermittently takes 300 mg. Otherwise is doing well.    10 point ROS otherwise negative        Labs:    Results for MOHAN GARCIA (MRN [id number]) as of 8/18/2020 13:27   Ref. Range 8/3/2020 15:01   Sodium Latest Ref Range: 133 - 144 mmol/L 137   Potassium Latest Ref Range: 3.4 - 5.3 mmol/L 4.5   Chloride Latest Ref Range: 94 - 109 mmol/L 105   Carbon Dioxide Latest Ref Range: 20 - 32 mmol/L 27   Urea Nitrogen Latest Ref Range: 7 - 30 mg/dL 13   Creatinine Latest Ref Range: 0.66 - 1.25 mg/dL 0.82   GFR Estimate Latest Ref Range: >60 mL/min/1.73_m2 >90   GFR Estimate If Black Latest Ref Range: >60 mL/min/1.73_m2 >90   Calcium Latest Ref Range: 8.5 - 10.1 mg/dL 9.2   Anion Gap Latest Ref Range: 3 - 14 mmol/L 6   Phosphorus Latest Ref Range: 2.5 - 4.5 mg/dL 2.3 (L)   Albumin Latest Ref Range: 3.4 - 5.0 g/dL 4.3   Protein Total Latest Ref Range: 6.8 - 8.8 g/dL 7.7   Bilirubin Total Latest Ref Range: 0.2 - 1.3  mg/dL 0.7   Alkaline Phosphatase Latest Ref Range: 40 - 150 U/L 80   ALT Latest Ref Range: 0 - 70 U/L 44   AST Latest Ref Range: 0 - 45 U/L 29   Glucose Latest Ref Range: 70 - 99 mg/dL 85   WBC Latest Ref Range: 4.0 - 11.0 10e9/L 5.5   Hemoglobin Latest Ref Range: 13.3 - 17.7 g/dL 14.0   Hematocrit Latest Ref Range: 40.0 - 53.0 % 41.5   Platelet Count Latest Ref Range: 150 - 450 10e9/L 196   RBC Count Latest Ref Range: 4.4 - 5.9 10e12/L 4.46   MCV Latest Ref Range: 78 - 100 fl 93   MCH Latest Ref Range: 26.5 - 33.0 pg 31.4   MCHC Latest Ref Range: 31.5 - 36.5 g/dL 33.7   RDW Latest Ref Range: 10.0 - 15.0 % 12.2   Diff Method Unknown Automated Method   % Neutrophils Latest Units: % 52.6   % Lymphocytes Latest Units: % 38.1   % Monocytes Latest Units: % 8.2   % Eosinophils Latest Units: % 0.4   % Basophils Latest Units: % 0.5   % Immature Granulocytes Latest Units: % 0.2   Nucleated RBCs Latest Ref Range: 0 /100 0   Absolute Neutrophil Latest Ref Range: 1.6 - 8.3 10e9/L 2.9   Absolute Lymphocytes Latest Ref Range: 0.8 - 5.3 10e9/L 2.1   Absolute Monocytes Latest Ref Range: 0.0 - 1.3 10e9/L 0.5   Absolute Eosinophils Latest Ref Range: 0.0 - 0.7 10e9/L 0.0   Absolute Basophils Latest Ref Range: 0.0 - 0.2 10e9/L 0.0   Abs Immature Granulocytes Latest Ref Range: 0 - 0.4 10e9/L 0.0   Absolute Nucleated RBC Unknown 0.0     10/14/2019:  BCR-ABL p190 negative  BCR-ABL p210 < 0.034% (down from 0.043% at last check in 6/2019).    2/2020   BCR-ABL P210: undetectable  BCR- ABL P190 undetectable    8/3/2020:   -- BCR-ABL p210: BCR-ABL1/ABL1 ratio of <0.039%.  --BCR-ABL: p190: undetectable    A/P: 34 year old man with CP CML since 2009 with molecular remission on imatinib.    1. CML: Currently on imatinib 400 mg daily (splitting to 300mg and 100mg with 30 mins apart due to nausea). Repeat P210 and P190 pending today. He notes good compliance on daily dosing but sometimes only takes 300 mg. Encouraged him to take 400 mg daily given  the mild detectability of the bcr-abp p210    2. Heme: Good counts    3. ID: No infectious issues  - flu shot in 10/2019    4. Renal: Normal renal function    5. GI: No liver issues. Mild nausea sometimes with the imatinib    6. Hypophosphatemia: intermittent need for phos repletion. I previously started him on twice weekly neutraphos to try to stabilize this better but he notes he stopped doing this. I encouraged him to restart given his mildly low phos    Final Plan:  - Follow-up BCR-ABL  - Lara 4 months    Sonia Bermudez MD

## 2020-08-18 NOTE — LETTER
Date:August 28, 2020      Patient was self referred, no letter generated. Do not send.        [facility] Health Information

## 2020-08-29 DIAGNOSIS — C92.10 CML (CHRONIC MYELOCYTIC LEUKEMIA) (H): Primary | ICD-10-CM

## 2020-09-03 DIAGNOSIS — C92.10 CML (CHRONIC MYELOCYTIC LEUKEMIA) (H): Primary | ICD-10-CM

## 2020-09-03 RX ORDER — IMATINIB MESYLATE 100 MG/1
400 TABLET, FILM COATED ORAL DAILY
Qty: 120 TABLET | Refills: 3 | Status: SHIPPED | OUTPATIENT
Start: 2020-09-03 | End: 2021-01-04

## 2020-10-12 ENCOUNTER — TELEPHONE (OUTPATIENT)
Dept: ONCOLOGY | Facility: CLINIC | Age: 35
End: 2020-10-12

## 2020-10-13 ENCOUNTER — TELEPHONE (OUTPATIENT)
Dept: ONCOLOGY | Facility: CLINIC | Age: 35
End: 2020-10-13

## 2020-10-13 NOTE — ORAL ONC MGMT
Oral Chemotherapy Monitoring Program    Primary Oncologist: Dr. Bermudez  Primary Oncology Clinic: [facility]  Cancer Diagnosis: CML      Drug: Gleevec 4 x 100mg tablets by mouth once daily   Start date: Fall 2009    Drug Interaction Assessment: No drug interactions found at this time. He takes no other medications.      Lab Monitoring Plan  CBC, CMP, Mag, Phos [id number] prior to appointments      Subjective/Objective:  Easton Pardo is a 34 year old male contacted by phone for a follow-up visit for oral chemotherapy.  He confirmed the correct dosing. He takes 3 tabs with dinner and then 1 tab about 30 minutes after to reduce nausea and GI side effects. Patient denies any new/worsening side effects or medication changes. He reports not missing any doses but sometimes he takes 2 tabs with food and 1 tab 30 minutes later. Some days he may have have taken only 3 x 100mg tablets. We discussed using pill box to put all 4 tablets to track daily dose, he has one and was agreeable to use it again.        ORAL CHEMOTHERAPY 8/7/2018 11/16/2018 12/18/2018 7/8/2019 10/16/2019 7/7/2020 10/13/2020   Drug Name Gleevec (Imatinib) Gleevec (Imatinib) Gleevec (Imatinib) Gleevec (Imatinib) Gleevec (Imatinib) Gleevec (Imatinib) Gleevec (Imatinib)   Current Dosage 200mg 400mg 400mg 400mg 400mg 400mg 400mg   Current Schedule BID Daily Daily Daily Daily Daily Daily   Cycle Details Continuous Continuous Continuous Continuous Continuous Continuous Continuous   Start Date of Last Cycle - - - - 9/23/2019 - -   Planned next cycle start date - - - - 10/23/2019 - 10/18/2020   Doses missed in last 2 weeks 0 0 0 1 3 0 0   Adherence Assessment Adherent Adherent Adherent Adherent Non-adherent Adherent Adherent   Reason for Non-adherence - - - - Other - -   Adherence Intervention Recommended - - - - Alarm - -   Adverse Effects No AE identified during assessment No AE identified during assessment No AE identified during assessment No  "AE identified during assessment No AE identified during assessment No AE identified during assessment No AE identified during assessment   Pharmacist Intervention(nausea) - - - - - - -   Intervention(s) - - - - - - -   Any new drug interactions? - - - No No No No   Is the dose as ordered appropriate for the patient? - - - Yes Yes Yes Yes   Has the patient been assessed within the past 6 months for depression? - - - - Yes - -   Has the patient missed any days of school, work, or other routine activity? - - - No No No No       Last PHQ-2 Score on record: No flowsheet data found.    Patient does not report depression symptoms.      Vitals:  BP:   BP Readings from Last 1 Encounters:   08/03/20 115/71     Wt Readings from Last 1 Encounters:   08/03/20 66.3 kg (146 lb 1.6 oz)     Estimated body surface area is 1.78 meters squared as calculated from the following:    Height as of 10/8/19: 1.73 m (5' 8.11\").    Weight as of 8/3/20: 66.3 kg (146 lb 1.6 oz).    Labs:  No results found for NA within last 30 days.     No results found for K within last 30 days.     No results found for CA within last 30 days.     No results found for Mag within last 30 days.     No results found for Phos within last 30 days.     No results found for ALBUMIN within last 30 days.     No results found for BUN within last 30 days.     No results found for CR within last 30 days.       No results found for AST within last 30 days.     No results found for ALT within last 30 days.     No results found for BILITOTAL within last 30 days.       No results found for WBC within last 30 days.     No results found for HGB within last 30 days.     No results found for PLT within last 30 days.     No results found for ANC within last 30 days.         Assessment:  Easton denies any new or worse side effects. Most days he takes 4 x 100mg tablets, some days may only take 3 x 100mg tablets. Discussed using pill box.     Adherence: PDC=0.83, patient has been on " therapy over ten years and had refilled early during past years. PDC in May 2020 was 0.9 with 11 refills in 12 months. Continue to monitor.      Plan:  Continue imatinib.   Use pill box.    Follow-Up:  12/15: Dr. Bermudez appointment and labs    Refill Due:  [facility] will deliver refill to home address 10/16/20.       Thank you for the opportunity to participate in the care of the above patient,  Sagar Wright, PharmD  Hematology/Oncology Clinical Pharmacist  [facility]  [facility]  [phone]

## 2020-12-08 DIAGNOSIS — C92.10 CML (CHRONIC MYELOCYTIC LEUKEMIA) (H): ICD-10-CM

## 2020-12-08 LAB
ALBUMIN SERPL-MCNC: 4.3 G/DL (ref 3.4–5)
ALP SERPL-CCNC: 67 U/L (ref 40–150)
ALT SERPL W P-5'-P-CCNC: 51 U/L (ref 0–70)
ANION GAP SERPL CALCULATED.3IONS-SCNC: 2 MMOL/L (ref 3–14)
AST SERPL W P-5'-P-CCNC: 28 U/L (ref 0–45)
BASOPHILS # BLD AUTO: 0 10E9/L (ref 0–0.2)
BASOPHILS NFR BLD AUTO: 0.3 %
BILIRUB SERPL-MCNC: 0.7 MG/DL (ref 0.2–1.3)
BUN SERPL-MCNC: 13 MG/DL (ref 7–30)
CALCIUM SERPL-MCNC: 8.8 MG/DL (ref 8.5–10.1)
CHLORIDE SERPL-SCNC: 107 MMOL/L (ref 94–109)
CO2 SERPL-SCNC: 28 MMOL/L (ref 20–32)
CREAT SERPL-MCNC: 0.89 MG/DL (ref 0.66–1.25)
DIFFERENTIAL METHOD BLD: NORMAL
EOSINOPHIL # BLD AUTO: 0 10E9/L (ref 0–0.7)
EOSINOPHIL NFR BLD AUTO: 0.3 %
ERYTHROCYTE [DISTWIDTH] IN BLOOD BY AUTOMATED COUNT: 13.6 % (ref 10–15)
GFR SERPL CREATININE-BSD FRML MDRD: >90 ML/MIN/{1.73_M2}
GLUCOSE SERPL-MCNC: 89 MG/DL (ref 70–99)
HCT VFR BLD AUTO: 43.2 % (ref 40–53)
HGB BLD-MCNC: 14.6 G/DL (ref 13.3–17.7)
IMM GRANULOCYTES # BLD: 0 10E9/L (ref 0–0.4)
IMM GRANULOCYTES NFR BLD: 0.2 %
LYMPHOCYTES # BLD AUTO: 2.4 10E9/L (ref 0.8–5.3)
LYMPHOCYTES NFR BLD AUTO: 41.6 %
MCH RBC QN AUTO: 31.4 PG (ref 26.5–33)
MCHC RBC AUTO-ENTMCNC: 33.8 G/DL (ref 31.5–36.5)
MCV RBC AUTO: 93 FL (ref 78–100)
MONOCYTES # BLD AUTO: 0.5 10E9/L (ref 0–1.3)
MONOCYTES NFR BLD AUTO: 9 %
NEUTROPHILS # BLD AUTO: 2.8 10E9/L (ref 1.6–8.3)
NEUTROPHILS NFR BLD AUTO: 48.6 %
NRBC # BLD AUTO: 0 10*3/UL
NRBC BLD AUTO-RTO: 0 /100
PHOSPHATE SERPL-MCNC: 2.1 MG/DL (ref 2.5–4.5)
PLATELET # BLD AUTO: 162 10E9/L (ref 150–450)
POTASSIUM SERPL-SCNC: 4.7 MMOL/L (ref 3.4–5.3)
PROT SERPL-MCNC: 7.3 G/DL (ref 6.8–8.8)
RBC # BLD AUTO: 4.65 10E12/L (ref 4.4–5.9)
SODIUM SERPL-SCNC: 137 MMOL/L (ref 133–144)
WBC # BLD AUTO: 5.8 10E9/L (ref 4–11)

## 2020-12-08 PROCEDURE — G0452 MOLECULAR PATHOLOGY INTERPR: HCPCS | Mod: 26 | Performed by: PATHOLOGY

## 2020-12-08 PROCEDURE — 84100 ASSAY OF PHOSPHORUS: CPT | Performed by: INTERNAL MEDICINE

## 2020-12-08 PROCEDURE — 85025 COMPLETE CBC W/AUTO DIFF WBC: CPT | Performed by: INTERNAL MEDICINE

## 2020-12-08 PROCEDURE — 81206 BCR/ABL1 GENE MAJOR BP: CPT | Performed by: INTERNAL MEDICINE

## 2020-12-08 PROCEDURE — 80053 COMPREHEN METABOLIC PANEL: CPT | Performed by: INTERNAL MEDICINE

## 2020-12-08 NOTE — NURSING NOTE
Chief Complaint   Patient presents with     Lab Only     labs drawn with vpt by rn.       Labs drawn with vpt by rn.  Pt tolerated well.  VS taken.  Pt checked in for next appt.    Melissa Gerard RN

## 2020-12-11 LAB — COPATH REPORT: NORMAL

## 2020-12-15 ENCOUNTER — VIRTUAL VISIT (OUTPATIENT)
Dept: TRANSPLANT | Facility: CLINIC | Age: 35
End: 2020-12-15
Attending: INTERNAL MEDICINE
Payer: COMMERCIAL

## 2020-12-15 DIAGNOSIS — C92.10 CML (CHRONIC MYELOCYTIC LEUKEMIA) (H): Primary | ICD-10-CM

## 2020-12-15 DIAGNOSIS — R79.0 LOW SERUM PHOSPHORUS FOR AGE: ICD-10-CM

## 2020-12-15 PROCEDURE — 99213 OFFICE O/P EST LOW 20 MIN: CPT | Mod: 95 | Performed by: INTERNAL MEDICINE

## 2020-12-15 RX ORDER — FAMOTIDINE 20 MG
2000 TABLET ORAL
COMMUNITY

## 2020-12-15 NOTE — PROGRESS NOTES
"Easton Pardo is a 35 year old male who is being evaluated via a billable telephone visit.      The patient has been notified of following:     \"This telephone visit will be conducted via a call between you and your physician/provider. We have found that certain health care needs can be provided without the need for a physical exam.  This service lets us provide the care you need with a short phone conversation.  If a prescription is necessary we can send it directly to your pharmacy.  If lab work is needed we can place an order for that and you can then stop by our lab to have the test done at a later time.    Telephone visits are billed at different rates depending on your insurance coverage. During this emergency period, for some insurers they may be billed the same as an in-person visit.  Please reach out to your insurance provider with any questions.    If during the course of the call the physician/provider feels a telephone visit is not appropriate, you will not be charged for this service.\"    Patient has given verbal consent for Telephone visit?  Yes    What phone number would you like to be contacted at? [phone]    How would you like to obtain your AVS? Deshawn    Vitals - Patient Reported  Weight (Patient Reported): 68 kg (150 lb)  Pain Score: No Pain (0)        I have reviewed and updated patient's allergy and medication list.    Concerns:HAS BEEN FORGETTING TO TAKE POTASSIUM - CONCERNED ABOUT LAB LEVELS  Refills: NONE      [facility]ina Wade [facility]    Phone call start time: 11:04  Phone call end time: 11:14  Care Coordination: 3 minutes  Total Time: 13 minutes    [facility] Follow-up Phone Visit  Dec 15, 2020          Disease and Treatment History:  1. Presented in 10/2009 with abdominal fullness, profound fatigue, SOB and found to have splenomegaly, elevated WBC, anemia, and retained platelet count  2. 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 " Imatinib started Fall 2009  -- CBC quickly normalized  -- Cytogenetic remission obtained (unclear date)  -- Major molecular response as of 2011  -- Molecular remission noted in 6/2012  4. Transfer of care to [facility] in 10/2012 and has been followed there until 6/2017. Waxing and waning p210 and p190 levels all < 0.006% on the IS. Last checked there on 6/2017:  -- P210 0.001% IS  -- P190: 0.0001%  5. Moved to [place] 6/2017  6. 6/2019 BCR-ABL p190 undetectable; BCR-ABL p210 <0.043%     HPI: I called Mohan today for a quick phone follow-up. Since I last chatted with him in 8/2020 he notes good consistency with taking the gleevec. He notes that he stopped taking the phosphorous and is worried about those levels. Notes no infections. No muscle cramps. Overall doing well. Continues working from home     10 point ROS otherwise negative           Labs:   Results for MOHAN GARCIA (MRN [id number]) as of 12/15/2020 11:03   Ref. Range 12/8/2020 11:49   Sodium Latest Ref Range: 133 - 144 mmol/L 137   Potassium Latest Ref Range: 3.4 - 5.3 mmol/L 4.7   Chloride Latest Ref Range: 94 - 109 mmol/L 107   Carbon Dioxide Latest Ref Range: 20 - 32 mmol/L 28   Urea Nitrogen Latest Ref Range: 7 - 30 mg/dL 13   Creatinine Latest Ref Range: 0.66 - 1.25 mg/dL 0.89   GFR Estimate Latest Ref Range: >60 mL/min/1.73_m2 >90   GFR Estimate If Black Latest Ref Range: >60 mL/min/1.73_m2 >90   Calcium Latest Ref Range: 8.5 - 10.1 mg/dL 8.8   Anion Gap Latest Ref Range: 3 - 14 mmol/L 2 (L)   Phosphorus Latest Ref Range: 2.5 - 4.5 mg/dL 2.1 (L)   Albumin Latest Ref Range: 3.4 - 5.0 g/dL 4.3   Protein Total Latest Ref Range: 6.8 - 8.8 g/dL 7.3   Bilirubin Total Latest Ref Range: 0.2 - 1.3 mg/dL 0.7   Alkaline Phosphatase Latest Ref Range: 40 - 150 U/L 67   ALT Latest Ref Range: 0 - 70 U/L 51   AST Latest Ref Range: 0 - 45 U/L 28   Glucose Latest Ref Range: 70 - 99 mg/dL 89   WBC Latest Ref Range: 4.0 - 11.0 10e9/L 5.8   Hemoglobin  Latest Ref Range: 13.3 - 17.7 g/dL 14.6   Hematocrit Latest Ref Range: 40.0 - 53.0 % 43.2   Platelet Count Latest Ref Range: 150 - 450 10e9/L 162   RBC Count Latest Ref Range: 4.4 - 5.9 10e12/L 4.65   MCV Latest Ref Range: 78 - 100 fl 93   MCH Latest Ref Range: 26.5 - 33.0 pg 31.4   MCHC Latest Ref Range: 31.5 - 36.5 g/dL 33.8   RDW Latest Ref Range: 10.0 - 15.0 % 13.6   Diff Method Unknown Automated Method   % Neutrophils Latest Units: % 48.6   % Lymphocytes Latest Units: % 41.6   % Monocytes Latest Units: % 9.0   % Eosinophils Latest Units: % 0.3   % Basophils Latest Units: % 0.3   % Immature Granulocytes Latest Units: % 0.2   Nucleated RBCs Latest Ref Range: 0 /100 0   Absolute Neutrophil Latest Ref Range: 1.6 - 8.3 10e9/L 2.8   Absolute Lymphocytes Latest Ref Range: 0.8 - 5.3 10e9/L 2.4   Absolute Monocytes Latest Ref Range: 0.0 - 1.3 10e9/L 0.5   Absolute Eosinophils Latest Ref Range: 0.0 - 0.7 10e9/L 0.0   Absolute Basophils Latest Ref Range: 0.0 - 0.2 10e9/L 0.0   Abs Immature Granulocytes Latest Ref Range: 0 - 0.4 10e9/L 0.0   Absolute Nucleated RBC Unknown 0.0       2/2020   BCR-ABL P210: undetectable  BCR- ABL P190 undetectable     8/3/2020:   -- BCR-ABL p210: BCR-ABL1/ABL1 ratio of <0.039%.  --BCR-ABL: p190: undetectable    12/8/20:   -- BCR-ABL1/ABL1 ratio of <0.043%.     A/P: 35 year old man with CP CML since 2009 with molecular remission on imatinib.     1. CML: Currently on imatinib 400 mg daily     CML is under control with ongoing major molecular response     2. Heme: Good counts     3. ID: No infectious issues  - flu shot hasn't happened yet. Encouraged him to do this     4. Renal: Normal renal function     5. GI: No liver issues. Mild nausea sometimes with the imatinib but controlled     6. Hypophosphatemia: intermittent need for phos repletion. I previously started him on twice weekly neutraphos to try to stabilize this better but he notes again today that he stopped doing this. I encouraged him  to restart given his mildly low phos and take it 2-3 times a week. Sent new script to the pharmacy     Final Plan:    - Lraa 4 months     Sonia Bermudez MD

## 2020-12-15 NOTE — LETTER
"    12/15/2020         RE: Easton Pardo  [address]  [place] [address]        Dear Colleague,    Thank you for referring your patient, Easton Pardo, to the [facility] BLOOD AND MARROW TRANSPLANT PROGRAM [place]. Please see a copy of my visit note below.    Easton Pardo is a 35 year old male who is being evaluated via a billable telephone visit.      The patient has been notified of following:     \"This telephone visit will be conducted via a call between you and your physician/provider. We have found that certain health care needs can be provided without the need for a physical exam.  This service lets us provide the care you need with a short phone conversation.  If a prescription is necessary we can send it directly to your pharmacy.  If lab work is needed we can place an order for that and you can then stop by our lab to have the test done at a later time.    Telephone visits are billed at different rates depending on your insurance coverage. During this emergency period, for some insurers they may be billed the same as an in-person visit.  Please reach out to your insurance provider with any questions.    If during the course of the call the physician/provider feels a telephone visit is not appropriate, you will not be charged for this service.\"    Patient has given verbal consent for Telephone visit?  Yes    What phone number would you like to be contacted at? [phone]    How would you like to obtain your AVS? Deshawn    Vitals - Patient Reported  Weight (Patient Reported): 68 kg (150 lb)  Pain Score: No Pain (0)        I have reviewed and updated patient's allergy and medication list.    Concerns:HAS BEEN FORGETTING TO TAKE POTASSIUM - CONCERNED ABOUT LAB LEVELS  Refills: NONE      Carolina Wade [facility]    Phone call start time: 11:04  Phone call end time: 11:14  Care Coordination: 3 minutes  Total Time: 13 minutes    [facility] Follow-up Phone Visit  Dec 15, 2020          Disease and Treatment History:  1. Presented in 10/2009 with abdominal fullness, profound fatigue, SOB and found to have splenomegaly, elevated WBC, anemia, and retained platelet count  2. 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 Imatinib started Fall 2009  -- CBC quickly normalized  -- Cytogenetic remission obtained (unclear date)  -- Major molecular response as of 2011  -- Molecular remission noted in 6/2012  4. Transfer of care to [facility] in 10/2012 and has been followed there until 6/2017. Waxing and waning p210 and p190 levels all < 0.006% on the IS. Last checked there on 6/2017:  -- P210 0.001% IS  -- P190: 0.0001%  5. Moved to [place] 6/2017  6. 6/2019 BCR-ABL p190 undetectable; BCR-ABL p210 <0.043%     HPI: I called Mohan today for a quick phone follow-up. Since I last chatted with him in 8/2020 he notes good consistency with taking the gleevec. He notes that he stopped taking the phosphorous and is worried about those levels. Notes no infections. No muscle cramps. Overall doing well. Continues working from home     10 point ROS otherwise negative           Labs:   Results for MOHAN GARCIA (MRN [id number]) as of 12/15/2020 11:03   Ref. Range 12/8/2020 11:49   Sodium Latest Ref Range: 133 - 144 mmol/L 137   Potassium Latest Ref Range: 3.4 - 5.3 mmol/L 4.7   Chloride Latest Ref Range: 94 - 109 mmol/L 107   Carbon Dioxide Latest Ref Range: 20 - 32 mmol/L 28   Urea Nitrogen Latest Ref Range: 7 - 30 mg/dL 13   Creatinine Latest Ref Range: 0.66 - 1.25 mg/dL 0.89   GFR Estimate Latest Ref Range: >60 mL/min/1.73_m2 >90   GFR Estimate If Black Latest Ref Range: >60 mL/min/1.73_m2 >90   Calcium Latest Ref Range: 8.5 - 10.1 mg/dL 8.8   Anion Gap Latest Ref Range: 3 - 14 mmol/L 2 (L)   Phosphorus Latest Ref Range: 2.5 - 4.5 mg/dL 2.1 (L)   Albumin Latest Ref Range: 3.4 - 5.0 g/dL 4.3   Protein Total Latest Ref  Range: 6.8 - 8.8 g/dL 7.3   Bilirubin Total Latest Ref Range: 0.2 - 1.3 mg/dL 0.7   Alkaline Phosphatase Latest Ref Range: 40 - 150 U/L 67   ALT Latest Ref Range: 0 - 70 U/L 51   AST Latest Ref Range: 0 - 45 U/L 28   Glucose Latest Ref Range: 70 - 99 mg/dL 89   WBC Latest Ref Range: 4.0 - 11.0 10e9/L 5.8   Hemoglobin Latest Ref Range: 13.3 - 17.7 g/dL 14.6   Hematocrit Latest Ref Range: 40.0 - 53.0 % 43.2   Platelet Count Latest Ref Range: 150 - 450 10e9/L 162   RBC Count Latest Ref Range: 4.4 - 5.9 10e12/L 4.65   MCV Latest Ref Range: 78 - 100 fl 93   MCH Latest Ref Range: 26.5 - 33.0 pg 31.4   MCHC Latest Ref Range: 31.5 - 36.5 g/dL 33.8   RDW Latest Ref Range: 10.0 - 15.0 % 13.6   Diff Method Unknown Automated Method   % Neutrophils Latest Units: % 48.6   % Lymphocytes Latest Units: % 41.6   % Monocytes Latest Units: % 9.0   % Eosinophils Latest Units: % 0.3   % Basophils Latest Units: % 0.3   % Immature Granulocytes Latest Units: % 0.2   Nucleated RBCs Latest Ref Range: 0 /100 0   Absolute Neutrophil Latest Ref Range: 1.6 - 8.3 10e9/L 2.8   Absolute Lymphocytes Latest Ref Range: 0.8 - 5.3 10e9/L 2.4   Absolute Monocytes Latest Ref Range: 0.0 - 1.3 10e9/L 0.5   Absolute Eosinophils Latest Ref Range: 0.0 - 0.7 10e9/L 0.0   Absolute Basophils Latest Ref Range: 0.0 - 0.2 10e9/L 0.0   Abs Immature Granulocytes Latest Ref Range: 0 - 0.4 10e9/L 0.0   Absolute Nucleated RBC Unknown 0.0       2/2020   BCR-ABL P210: undetectable  BCR- ABL P190 undetectable     8/3/2020:   -- BCR-ABL p210: BCR-ABL1/ABL1 ratio of <0.039%.  --BCR-ABL: p190: undetectable    12/8/20:   -- BCR-ABL1/ABL1 ratio of <0.043%.     A/P: 35 year old man with CP CML since 2009 with molecular remission on imatinib.     1. CML: Currently on imatinib 400 mg daily     CML is under control with ongoing major molecular response     2. Heme: Good counts     3. ID: No infectious issues  - flu shot hasn't happened yet. Encouraged him to do this     4. Renal:  Normal renal function     5. GI: No liver issues. Mild nausea sometimes with the imatinib but controlled     6. Hypophosphatemia: intermittent need for phos repletion. I previously started him on twice weekly neutraphos to try to stabilize this better but he notes again today that he stopped doing this. I encouraged him to restart given his mildly low phos and take it 2-3 times a week. Sent new script to the pharmacy     Final Plan:    - Lara 4 months     Sonia Bermudez MD                Again, thank you for allowing me to participate in the care of your patient.        Sincerely,        Sonia Bermudez MD

## 2020-12-15 NOTE — LETTER
Date:December 17, 2020      Patient was self referred, no letter generated. Do not send.        [facility] Health Information

## 2020-12-26 DIAGNOSIS — C92.10 CML (CHRONIC MYELOCYTIC LEUKEMIA) (H): Primary | ICD-10-CM

## 2021-01-03 ENCOUNTER — HEALTH MAINTENANCE LETTER (OUTPATIENT)
Age: 36
End: 2021-01-03

## 2021-01-04 DIAGNOSIS — C92.10 CML (CHRONIC MYELOCYTIC LEUKEMIA) (H): Primary | ICD-10-CM

## 2021-01-04 RX ORDER — IMATINIB MESYLATE 100 MG/1
400 TABLET, FILM COATED ORAL DAILY
Qty: 120 TABLET | Refills: 3 | Status: SHIPPED | OUTPATIENT
Start: 2021-01-04 | End: 2021-05-03

## 2021-03-23 ENCOUNTER — TELEPHONE (OUTPATIENT)
Dept: ONCOLOGY | Facility: CLINIC | Age: 36
End: 2021-03-23

## 2021-03-23 NOTE — TELEPHONE ENCOUNTER
Writer received a message that the patient was starting a new job and will not have insurance. The patient currently still has MA which is active and will need to be cancelled for free medication. Writer placed a call to the patient and left a message for him to return to discuss with my direct phone number.    Kymberly George CPhT  [facility]  Oncology Pharmacy Liaison  [email or web address]  Phone: [phone]  Fax: [phone]

## 2021-04-07 ENCOUNTER — TELEPHONE (OUTPATIENT)
Dept: ONCOLOGY | Facility: CLINIC | Age: 36
End: 2021-04-07

## 2021-04-07 NOTE — TELEPHONE ENCOUNTER
Oral Chemotherapy Monitoring Program    Subjective/Objective:  Easton Pardo is a 35 year old male contacted by phone for a follow-up visit for oral chemotherapy.  Easton confirms taking the appropriate dose of Gleevec 400 mg daily. Denies new or worsening side effects and recent hospital or ED visits. Patient has not had any recent medication changes. He had questions about the COVID vaccine, prefers mRNA, we discussed how to sign up for an appt.     ORAL CHEMOTHERAPY 12/18/2018 7/8/2019 10/16/2019 7/7/2020 10/13/2020 1/4/2021 4/7/2021   Assessment Type - - - - - Refill Monthly Follow up   Diagnosis Code - - - - - Chronic Myeloid Leukemia (CML) Chronic Myeloid Leukemia (CML)   Providers - - - - - Dr. Lara Bermudez   [facility] - - - - [facility]   Drug Name Gleevec (Imatinib) Gleevec (Imatinib) Gleevec (Imatinib) Gleevec (Imatinib) Gleevec (Imatinib) Gleevec (Imatinib) Gleevec (imatinib)   Dose - - - - - 400 mg 400 mg   Current Schedule Daily Daily Daily Daily Daily Daily Daily   Cycle Details Continuous Continuous Continuous Continuous Continuous Continuous Continuous   Start Date of Last Cycle - - 9/23/2019 - - - -   Planned next cycle start date - - 10/23/2019 - 10/18/2020 - 4/9/2021   Doses missed in last 2 weeks 0 1 3 0 0 - -   Adherence Assessment Adherent Adherent Non-adherent Adherent Adherent - Adherent   Reason for Non-adherence - - Other - Other - -   Adherence Intervention Recommended - - Alarm - Pill box - -   Adverse Effects No AE identified during assessment No AE identified during assessment No AE identified during assessment No AE identified during assessment No AE identified during assessment - No AE identified during assessment   Pharmacist Intervention(nausea) - - - - - - -   Any new drug interactions? - No No No No - No   Is the dose as ordered appropriate for the patient? - Yes Yes Yes Yes - Yes   Has the patient been assessed within the past 6 months for  "depression? - - Yes - - - -   Has the patient missed any days of school, work, or other routine activity? - No No No No - -       Last PHQ-2 Score on record: No flowsheet data found.    Vitals:  BP:   BP Readings from Last 1 Encounters:   08/03/20 115/71     Wt Readings from Last 1 Encounters:   08/03/20 66.3 kg (146 lb 1.6 oz)     Estimated body surface area is 1.78 meters squared as calculated from the following:    Height as of 10/8/19: 1.73 m (5' 8.11\").    Weight as of 8/3/20: 66.3 kg (146 lb 1.6 oz).    Labs:  No results found for NA within last 30 days.     No results found for K within last 30 days.     No results found for CA within last 30 days.     No results found for Mag within last 30 days.     No results found for Phos within last 30 days.     No results found for ALBUMIN within last 30 days.     No results found for BUN within last 30 days.     No results found for CR within last 30 days.     No results found for AST within last 30 days.     No results found for ALT within last 30 days.     No results found for BILITOTAL within last 30 days.     No results found for WBC within last 30 days.     No results found for HGB within last 30 days.     No results found for PLT within last 30 days.     No results found for ANC within last 30 days.       Assessment/Plan:  Easton is tolerating Gleevec without issues. PDC=0.97, no concerns with adherence.     Follow-Up:  5/11: labs  5/18: [place] appt  7/1/21: quarterly assessment    Refill Due:  [facility] will deliver on Friday 4/09/21.     Corazon Romano, PharmD  Hematology/Oncology Clinical Pharmacist  [facility]  [facility]  [phone]      "

## 2021-04-13 DIAGNOSIS — R79.0 LOW SERUM PHOSPHORUS FOR AGE: ICD-10-CM

## 2021-04-25 ENCOUNTER — HEALTH MAINTENANCE LETTER (OUTPATIENT)
Age: 36
End: 2021-04-25

## 2021-04-25 DIAGNOSIS — C92.10 CML (CHRONIC MYELOCYTIC LEUKEMIA) (H): Primary | ICD-10-CM

## 2021-05-03 DIAGNOSIS — C92.10 CML (CHRONIC MYELOCYTIC LEUKEMIA) (H): Primary | ICD-10-CM

## 2021-05-03 RX ORDER — IMATINIB MESYLATE 100 MG/1
400 TABLET, FILM COATED ORAL DAILY
Qty: 120 TABLET | Refills: 3 | Status: SHIPPED | OUTPATIENT
Start: 2021-05-03 | End: 2022-12-30

## 2021-05-11 VITALS
HEART RATE: 82 BPM | BODY MASS INDEX: 25.76 KG/M2 | RESPIRATION RATE: 16 BRPM | SYSTOLIC BLOOD PRESSURE: 118 MMHG | WEIGHT: 170 LBS | OXYGEN SATURATION: 97 % | TEMPERATURE: 97.6 F | DIASTOLIC BLOOD PRESSURE: 74 MMHG

## 2021-05-11 DIAGNOSIS — C92.10 CML (CHRONIC MYELOCYTIC LEUKEMIA) (H): ICD-10-CM

## 2021-05-11 LAB
ALBUMIN SERPL-MCNC: 4.2 G/DL (ref 3.4–5)
ALP SERPL-CCNC: 59 U/L (ref 40–150)
ALT SERPL W P-5'-P-CCNC: 63 U/L (ref 0–70)
ANION GAP SERPL CALCULATED.3IONS-SCNC: 4 MMOL/L (ref 3–14)
AST SERPL W P-5'-P-CCNC: 30 U/L (ref 0–45)
BASOPHILS # BLD AUTO: 0 10E9/L (ref 0–0.2)
BASOPHILS NFR BLD AUTO: 0.2 %
BILIRUB SERPL-MCNC: 0.5 MG/DL (ref 0.2–1.3)
BUN SERPL-MCNC: 11 MG/DL (ref 7–30)
CALCIUM SERPL-MCNC: 9 MG/DL (ref 8.5–10.1)
CHLORIDE SERPL-SCNC: 107 MMOL/L (ref 94–109)
CO2 SERPL-SCNC: 30 MMOL/L (ref 20–32)
CREAT SERPL-MCNC: 0.81 MG/DL (ref 0.66–1.25)
DIFFERENTIAL METHOD BLD: ABNORMAL
EOSINOPHIL # BLD AUTO: 0 10E9/L (ref 0–0.7)
EOSINOPHIL NFR BLD AUTO: 0.5 %
ERYTHROCYTE [DISTWIDTH] IN BLOOD BY AUTOMATED COUNT: 13 % (ref 10–15)
GFR SERPL CREATININE-BSD FRML MDRD: >90 ML/MIN/{1.73_M2}
GLUCOSE SERPL-MCNC: 97 MG/DL (ref 70–99)
HCT VFR BLD AUTO: 40.5 % (ref 40–53)
HGB BLD-MCNC: 13.9 G/DL (ref 13.3–17.7)
IMM GRANULOCYTES # BLD: 0 10E9/L (ref 0–0.4)
IMM GRANULOCYTES NFR BLD: 0.2 %
LYMPHOCYTES # BLD AUTO: 1.9 10E9/L (ref 0.8–5.3)
LYMPHOCYTES NFR BLD AUTO: 47.3 %
MAGNESIUM SERPL-MCNC: 2.2 MG/DL (ref 1.6–2.3)
MCH RBC QN AUTO: 32.3 PG (ref 26.5–33)
MCHC RBC AUTO-ENTMCNC: 34.3 G/DL (ref 31.5–36.5)
MCV RBC AUTO: 94 FL (ref 78–100)
MONOCYTES # BLD AUTO: 0.3 10E9/L (ref 0–1.3)
MONOCYTES NFR BLD AUTO: 8.4 %
NEUTROPHILS # BLD AUTO: 1.8 10E9/L (ref 1.6–8.3)
NEUTROPHILS NFR BLD AUTO: 43.4 %
NRBC # BLD AUTO: 0 10*3/UL
NRBC BLD AUTO-RTO: 0 /100
PHOSPHATE SERPL-MCNC: 2.3 MG/DL (ref 2.5–4.5)
PLATELET # BLD AUTO: 175 10E9/L (ref 150–450)
POTASSIUM SERPL-SCNC: 4.3 MMOL/L (ref 3.4–5.3)
PROT SERPL-MCNC: 7 G/DL (ref 6.8–8.8)
RBC # BLD AUTO: 4.3 10E12/L (ref 4.4–5.9)
SODIUM SERPL-SCNC: 141 MMOL/L (ref 133–144)
WBC # BLD AUTO: 4 10E9/L (ref 4–11)

## 2021-05-11 PROCEDURE — 81206 BCR/ABL1 GENE MAJOR BP: CPT | Performed by: INTERNAL MEDICINE

## 2021-05-11 PROCEDURE — 80053 COMPREHEN METABOLIC PANEL: CPT | Performed by: INTERNAL MEDICINE

## 2021-05-11 PROCEDURE — 85025 COMPLETE CBC W/AUTO DIFF WBC: CPT | Performed by: INTERNAL MEDICINE

## 2021-05-11 PROCEDURE — G0452 MOLECULAR PATHOLOGY INTERPR: HCPCS | Mod: 26 | Performed by: PATHOLOGY

## 2021-05-11 PROCEDURE — 83735 ASSAY OF MAGNESIUM: CPT | Performed by: INTERNAL MEDICINE

## 2021-05-11 PROCEDURE — 84100 ASSAY OF PHOSPHORUS: CPT | Performed by: INTERNAL MEDICINE

## 2021-05-11 ASSESSMENT — PAIN SCALES - GENERAL: PAINLEVEL: NO PAIN (0)

## 2021-05-14 LAB — COPATH REPORT: NORMAL

## 2021-06-01 ENCOUNTER — DOCUMENTATION ONLY (OUTPATIENT)
Dept: ONCOLOGY | Facility: CLINIC | Age: 36
End: 2021-06-01

## 2021-06-01 ENCOUNTER — VIRTUAL VISIT (OUTPATIENT)
Dept: TRANSPLANT | Facility: CLINIC | Age: 36
End: 2021-06-01
Attending: INTERNAL MEDICINE
Payer: COMMERCIAL

## 2021-06-01 DIAGNOSIS — C92.10 CML (CHRONIC MYELOCYTIC LEUKEMIA) (H): Primary | ICD-10-CM

## 2021-06-01 PROCEDURE — 99441 PR PHYSICIAN TELEPHONE EVALUATION 5-10 MIN: CPT | Mod: 95 | Performed by: INTERNAL MEDICINE

## 2021-06-01 NOTE — LETTER
"    6/1/2021         RE: Easton Pardo  [address]  [place] [address]        Dear Colleague,    Thank you for referring your patient, Easton Pardo, to the [facility] BLOOD AND MARROW TRANSPLANT PROGRAM [place]. Please see a copy of my visit note below.    Easton is a 35 year old who is being evaluated via a billable telephone visit.      What phone number would you like to be contacted at? [phone]  How would you like to obtain your AVS? Deshawn     Vitals - Patient Reported  Height (Patient Reported): 170.2 cm (5' 7\")  Pain Score: No Pain (0)    Rosanne Jimenez [facility] June 1, 2021  9:32 AM     Phone call start time: 10:22  Phone call end: 10:28  Care Coordination and documentation: 4 minutes  Total Time: 10 minutes      [facility] Follow-up Phone Visit  Jun 1, 2021             Disease and Treatment History:  1. Presented in 10/2009 with abdominal fullness, profound fatigue, SOB and found to have splenomegaly, elevated WBC, anemia, and retained platelet count  2. 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 Imatinib started Fall 2009  -- CBC quickly normalized  -- Cytogenetic remission obtained (unclear date)  -- Major molecular response as of 2011  -- Molecular remission noted in 6/2012  4. Transfer of care to [facility] in 10/2012 and has been followed there until 6/2017. Waxing and waning p210 and p190 levels all < 0.006% on the IS. Last checked there on 6/2017:  -- P210 0.001% IS  -- P190: 0.0001%  5. Moved to [place] 6/2017  6. 6/2019 BCR-ABL p190 undetectable; BCR-ABL p210 <0.043%     HPI: I called Easton today for a quick phone follow-up. Since I last chatted with him in [phone] he notes good consistency with taking the gleevec. Does note that he dropped dosing down for a few days after his second COVID vaccine but then but up to full dose. Notes no infections, no bleeding, major GI issues, no " abdominal pain. New job programing over the last handful of months and that is going well. No other new issues. Hasn't been taking his phos replacement       10 point ROS otherwise negative           Labs:   Results for MOHAN GARCIA (MRN [id number]) as of 6/1/2021 10:23   Ref. Range 5/11/2021 15:02   Sodium Latest Ref Range: 133 - 144 mmol/L 141   Potassium Latest Ref Range: 3.4 - 5.3 mmol/L 4.3   Chloride Latest Ref Range: 94 - 109 mmol/L 107   Carbon Dioxide Latest Ref Range: 20 - 32 mmol/L 30   Urea Nitrogen Latest Ref Range: 7 - 30 mg/dL 11   Creatinine Latest Ref Range: 0.66 - 1.25 mg/dL 0.81   GFR Estimate Latest Ref Range: >60 mL/min/1.73_m2 >90   GFR Estimate If Black Latest Ref Range: >60 mL/min/1.73_m2 >90   Calcium Latest Ref Range: 8.5 - 10.1 mg/dL 9.0   Anion Gap Latest Ref Range: 3 - 14 mmol/L 4   Magnesium Latest Ref Range: 1.6 - 2.3 mg/dL 2.2   Phosphorus Latest Ref Range: 2.5 - 4.5 mg/dL 2.3 (L)   Albumin Latest Ref Range: 3.4 - 5.0 g/dL 4.2   Protein Total Latest Ref Range: 6.8 - 8.8 g/dL 7.0   Bilirubin Total Latest Ref Range: 0.2 - 1.3 mg/dL 0.5   Alkaline Phosphatase Latest Ref Range: 40 - 150 U/L 59   ALT Latest Ref Range: 0 - 70 U/L 63   AST Latest Ref Range: 0 - 45 U/L 30   Glucose Latest Ref Range: 70 - 99 mg/dL 97   WBC Latest Ref Range: 4.0 - 11.0 10e9/L 4.0   Hemoglobin Latest Ref Range: 13.3 - 17.7 g/dL 13.9   Hematocrit Latest Ref Range: 40.0 - 53.0 % 40.5   Platelet Count Latest Ref Range: 150 - 450 10e9/L 175   RBC Count Latest Ref Range: 4.4 - 5.9 10e12/L 4.30 (L)   MCV Latest Ref Range: 78 - 100 fl 94   MCH Latest Ref Range: 26.5 - 33.0 pg 32.3   MCHC Latest Ref Range: 31.5 - 36.5 g/dL 34.3   RDW Latest Ref Range: 10.0 - 15.0 % 13.0   Diff Method Unknown Automated Method   % Neutrophils Latest Units: % 43.4   % Lymphocytes Latest Units: % 47.3   % Monocytes Latest Units: % 8.4   % Eosinophils Latest Units: % 0.5   % Basophils Latest Units: % 0.2   % Immature  Granulocytes Latest Units: % 0.2   Nucleated RBCs Latest Ref Range: 0 /100 0   Absolute Neutrophil Latest Ref Range: 1.6 - 8.3 10e9/L 1.8   Absolute Lymphocytes Latest Ref Range: 0.8 - 5.3 10e9/L 1.9   Absolute Monocytes Latest Ref Range: 0.0 - 1.3 10e9/L 0.3   Absolute Eosinophils Latest Ref Range: 0.0 - 0.7 10e9/L 0.0   Absolute Basophils Latest Ref Range: 0.0 - 0.2 10e9/L 0.0   Abs Immature Granulocytes Latest Ref Range: 0 - 0.4 10e9/L 0.0   Absolute Nucleated RBC Unknown 0.0        2/2020   BCR-ABL P210: undetectable  BCR- ABL P190 undetectable     8/3/2020:   -- BCR-ABL p210: BCR-ABL1/ABL1 ratio of <0.039%.  --BCR-ABL: p190: undetectable     12/8/20:              -- BCR-ABL1/ABL1 ratio of <0.043%.     5/11/21 BCR-ABL:    -- BCR-ABL1/ABL1 ratio of <0.038%     A/P: 35 year old man with CP CML since 2009 with molecular remission on imatinib.     1. CML: Currently on imatinib 400 mg daily      CML is under control with ongoing major molecular response. Overall good compliance. Repeat in 6 months     2. Heme: Good counts. No change     3. ID: No infectious issues  - completed COVID vaccine X 2     4. Renal: Normal renal function     5. GI: No liver issues.      6. Hypophosphatemia: intermittent need for phos repletion. I have previously started him on twice weekly neutraphos to try to stabilize this better but he notes again today that he stopped doing this. I again encouraged him to at least do the replacement 2-3 times a week given the just slightly low level. He said he would try     Final Plan:     - [facility] and labs in 6 months. I informed him of my departure from the [place] and plan for alternative follow-up.      Sonia Bermudez MD        Again, thank you for allowing me to participate in the care of your patient.        Sincerely,        Sonia Bermudez MD

## 2021-06-01 NOTE — PROGRESS NOTES
"Mohan is a 35 year old who is being evaluated via a billable telephone visit.      What phone number would you like to be contacted at? [phone]  How would you like to obtain your AVS? Deshawn     Vitals - Patient Reported  Height (Patient Reported): 170.2 cm (5' 7\")  Pain Score: No Pain (0)    Rosanne Jimenez [facility] June 1, 2021  9:32 AM     Phone call start time: 10:22  Phone call end: 10:28  Care Coordination and documentation: 4 minutes  Total Time: 10 minutes      [facility] Follow-up Phone Visit  Jun 1, 2021             Disease and Treatment History:  1. Presented in 10/2009 with abdominal fullness, profound fatigue, SOB and found to have splenomegaly, elevated WBC, anemia, and retained platelet count  2. 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 Imatinib started Fall 2009  -- CBC quickly normalized  -- Cytogenetic remission obtained (unclear date)  -- Major molecular response as of 2011  -- Molecular remission noted in 6/2012  4. Transfer of care to [facility] in 10/2012 and has been followed there until 6/2017. Waxing and waning p210 and p190 levels all < 0.006% on the IS. Last checked there on 6/2017:  -- P210 0.001% IS  -- P190: 0.0001%  5. Moved to [place] 6/2017  6. 6/2019 BCR-ABL p190 undetectable; BCR-ABL p210 <0.043%     HPI: I called Mohan today for a quick phone follow-up. Since I last chatted with him in [phone] he notes good consistency with taking the gleevec. Does note that he dropped dosing down for a few days after his second COVID vaccine but then but up to full dose. Notes no infections, no bleeding, major GI issues, no abdominal pain. New job programing over the last handful of months and that is going well. No other new issues. Hasn't been taking his phos replacement       10 point ROS otherwise negative           Labs:   Results for MOHAN GARCIA (MRN [id number]) as of 6/1/2021 10:23   Ref. Range 5/11/2021 15:02   Sodium " Latest Ref Range: 133 - 144 mmol/L 141   Potassium Latest Ref Range: 3.4 - 5.3 mmol/L 4.3   Chloride Latest Ref Range: 94 - 109 mmol/L 107   Carbon Dioxide Latest Ref Range: 20 - 32 mmol/L 30   Urea Nitrogen Latest Ref Range: 7 - 30 mg/dL 11   Creatinine Latest Ref Range: 0.66 - 1.25 mg/dL 0.81   GFR Estimate Latest Ref Range: >60 mL/min/1.73_m2 >90   GFR Estimate If Black Latest Ref Range: >60 mL/min/1.73_m2 >90   Calcium Latest Ref Range: 8.5 - 10.1 mg/dL 9.0   Anion Gap Latest Ref Range: 3 - 14 mmol/L 4   Magnesium Latest Ref Range: 1.6 - 2.3 mg/dL 2.2   Phosphorus Latest Ref Range: 2.5 - 4.5 mg/dL 2.3 (L)   Albumin Latest Ref Range: 3.4 - 5.0 g/dL 4.2   Protein Total Latest Ref Range: 6.8 - 8.8 g/dL 7.0   Bilirubin Total Latest Ref Range: 0.2 - 1.3 mg/dL 0.5   Alkaline Phosphatase Latest Ref Range: 40 - 150 U/L 59   ALT Latest Ref Range: 0 - 70 U/L 63   AST Latest Ref Range: 0 - 45 U/L 30   Glucose Latest Ref Range: 70 - 99 mg/dL 97   WBC Latest Ref Range: 4.0 - 11.0 10e9/L 4.0   Hemoglobin Latest Ref Range: 13.3 - 17.7 g/dL 13.9   Hematocrit Latest Ref Range: 40.0 - 53.0 % 40.5   Platelet Count Latest Ref Range: 150 - 450 10e9/L 175   RBC Count Latest Ref Range: 4.4 - 5.9 10e12/L 4.30 (L)   MCV Latest Ref Range: 78 - 100 fl 94   MCH Latest Ref Range: 26.5 - 33.0 pg 32.3   MCHC Latest Ref Range: 31.5 - 36.5 g/dL 34.3   RDW Latest Ref Range: 10.0 - 15.0 % 13.0   Diff Method Unknown Automated Method   % Neutrophils Latest Units: % 43.4   % Lymphocytes Latest Units: % 47.3   % Monocytes Latest Units: % 8.4   % Eosinophils Latest Units: % 0.5   % Basophils Latest Units: % 0.2   % Immature Granulocytes Latest Units: % 0.2   Nucleated RBCs Latest Ref Range: 0 /100 0   Absolute Neutrophil Latest Ref Range: 1.6 - 8.3 10e9/L 1.8   Absolute Lymphocytes Latest Ref Range: 0.8 - 5.3 10e9/L 1.9   Absolute Monocytes Latest Ref Range: 0.0 - 1.3 10e9/L 0.3   Absolute Eosinophils Latest Ref Range: 0.0 - 0.7 10e9/L 0.0   Absolute  Basophils Latest Ref Range: 0.0 - 0.2 10e9/L 0.0   Abs Immature Granulocytes Latest Ref Range: 0 - 0.4 10e9/L 0.0   Absolute Nucleated RBC Unknown 0.0        2/2020   BCR-ABL P210: undetectable  BCR- ABL P190 undetectable     8/3/2020:   -- BCR-ABL p210: BCR-ABL1/ABL1 ratio of <0.039%.  --BCR-ABL: p190: undetectable     12/8/20:              -- BCR-ABL1/ABL1 ratio of <0.043%.     5/11/21 BCR-ABL:    -- BCR-ABL1/ABL1 ratio of <0.038%     A/P: 35 year old man with CP CML since 2009 with molecular remission on imatinib.     1. CML: Currently on imatinib 400 mg daily      CML is under control with ongoing major molecular response. Overall good compliance. Repeat in 6 months     2. Heme: Good counts. No change     3. ID: No infectious issues  - completed COVID vaccine X 2     4. Renal: Normal renal function     5. GI: No liver issues.      6. Hypophosphatemia: intermittent need for phos repletion. I have previously started him on twice weekly neutraphos to try to stabilize this better but he notes again today that he stopped doing this. I again encouraged him to at least do the replacement 2-3 times a week given the just slightly low level. He said he would try     Final Plan:     - [facility] and labs in 6 months. I informed him of my departure from the [facility] and plan for alternative follow-up.      Sonia Bermudez MD

## 2021-07-27 ENCOUNTER — TELEPHONE (OUTPATIENT)
Dept: ONCOLOGY | Facility: CLINIC | Age: 36
End: 2021-07-27

## 2021-07-27 NOTE — ORAL ONC MGMT
Oral Chemotherapy Monitoring Program     Placed call to patient in follow up of oral chemotherapy 7/27, 7/30, 8/3 and 8/6. Left message requesting call back. No drug names were mentioned. Will update when response received.     Aleyda Vidal, PharmD, BCOP  Hematology/Oncology Clinical Pharmacist  [facility]  [facility]

## 2021-08-13 NOTE — ORAL ONC MGMT
Oral Chemotherapy Monitoring Program    Subjective/Objective:  Easton Pardo is a 35 year old male contacted by phone for a follow-up visit for oral chemotherapy.  Easton confirms taking Gleevec 400 mg daily. He states that he is tolerating this well, with no new or worsening adverse effects. He denies recent medication changes or missed doses of Gleevec. Is a bit late in filling this month because he wasn't sure whether his insurance was still active. PDC still 1, no concerns for adherence at this time.     ORAL CHEMOTHERAPY 10/13/2020 1/4/2021 4/7/2021 5/3/2021 5/11/2021 6/1/2021 8/13/2021   Assessment Type - Refill Monthly Follow up Refill Lab Monitoring Chart Review Quarterly Follow up   Diagnosis Code - Chronic Myeloid Leukemia (CML) Chronic Myeloid Leukemia (CML) Chronic Myeloid Leukemia (CML) Chronic Myeloid Leukemia (CML) Chronic Myeloid Leukemia (CML) Chronic Myeloid Leukemia (CML)   Providers - Dr. Lara Aldana?   [facility]   Drug Name Gleevec (Imatinib) Gleevec (Imatinib) Gleevec (imatinib) Gleevec (imatinib) Gleevec (imatinib) Gleevec (imatinib) Gleevec (imatinib)   Dose - 400 mg 400 mg 400 mg 400 mg 400 mg 400 mg   Current Schedule Daily Daily Daily Daily Daily Daily Daily   Cycle Details Continuous Continuous Continuous Continuous Continuous Continuous -   Start Date of Last Cycle - - - - - - -   Planned next cycle start date 10/18/2020 - 4/9/2021 - - - -   Doses missed in last 2 weeks 0 - - - - - 0   Adherence Assessment Adherent - Adherent - - - Adherent   Reason for Non-adherence Other - - - - - -   Adherence Intervention Recommended Pill box - - - - - -   Adverse Effects No AE identified during assessment - No AE identified during assessment - - - No AE identified during assessment   Pharmacist Intervention(nausea) - - - - - - -   Any new drug interactions? No - No - - - No  "  Is the dose as ordered appropriate for the patient? Yes - Yes - - - Yes   Has the patient been assessed within the past 6 months for depression? - - - - - - -   Has the patient missed any days of school, work, or other routine activity? No - - - - - -       Last PHQ-2 Score on record: No flowsheet data found.    Vitals:  BP:   BP Readings from Last 1 Encounters:   05/11/21 118/74     Wt Readings from Last 1 Encounters:   05/11/21 77.1 kg (170 lb)     Estimated body surface area is 1.92 meters squared as calculated from the following:    Height as of 10/8/19: 1.73 m (5' 8.11\").    Weight as of 5/11/21: 77.1 kg (170 lb).    Labs:  No results found for NA within last 30 days.     No results found for K within last 30 days.     No results found for CA within last 30 days.     No results found for Mag within last 30 days.     No results found for Phos within last 30 days.     No results found for ALBUMIN within last 30 days.     No results found for BUN within last 30 days.     No results found for CR within last 30 days.     No results found for AST within last 30 days.     No results found for ALT within last 30 days.     No results found for BILITOTAL within last 30 days.     No results found for WBC within last 30 days.     No results found for HGB within last 30 days.     No results found for PLT within last 30 days.     No results found for ANC within last 30 days.         Assessment/Plan:  Easton is tolerating Gleevec well. Continue current therapy.     Follow-Up:  12/1: labs and [facility] appt    Refill Due:  [facility] will deliver Gleevec 8/17    Aleyda Vidal, Jose MD, BCOP  Hematology/Oncology Clinical Pharmacist  [facility]  [facility]  [phone]      "

## 2021-09-02 DIAGNOSIS — C92.10 CML (CHRONIC MYELOCYTIC LEUKEMIA) (H): Primary | ICD-10-CM

## 2021-09-08 ENCOUNTER — DOCUMENTATION ONLY (OUTPATIENT)
Dept: ONCOLOGY | Facility: CLINIC | Age: 36
End: 2021-09-08

## 2021-09-08 DIAGNOSIS — C92.10 CML (CHRONIC MYELOCYTIC LEUKEMIA) (H): Primary | ICD-10-CM

## 2021-09-08 RX ORDER — IMATINIB MESYLATE 100 MG/1
400 TABLET, FILM COATED ORAL DAILY
Qty: 120 TABLET | Refills: 3 | Status: SHIPPED | OUTPATIENT
Start: 2021-09-08 | End: 2022-12-30

## 2021-10-10 ENCOUNTER — HEALTH MAINTENANCE LETTER (OUTPATIENT)
Age: 36
End: 2021-10-10

## 2021-11-08 ENCOUNTER — TELEPHONE (OUTPATIENT)
Dept: ONCOLOGY | Facility: CLINIC | Age: 36
End: 2021-11-08
Payer: COMMERCIAL

## 2021-11-08 NOTE — TELEPHONE ENCOUNTER
Oral Chemotherapy Monitoring Program    Subjective/Objective:  Easton Pardo is a 36 year old male contacted by phone for a follow-up visit for oral chemotherapy.  Easton confirms taking the appropriate dose of Gleevec 400 mg once daily. Denies missed doses and recent hospital or ED visits. Patient has not had any recent medication changes. He reports that a couple of months ago he missed a few doses, he has been taking regularly ever since. Reinforced the importance of adherence. He has noticed a new side effect, he describes this as horizontal lines on his two big toes. This sounds like Beau's lines - a rare but known AE from Gleevec. This is not uncomfortable or bothersome, advised him to address this at his upcoming [facility] appt.     ORAL CHEMOTHERAPY 4/7/2021 5/3/2021 5/11/2021 6/1/2021 8/13/2021 9/8/2021 11/8/2021   Assessment Type Monthly Follow up Refill Lab Monitoring Chart Review Quarterly Follow up Refill Quarterly Follow up   Diagnosis Code Chronic Myeloid Leukemia (CML) Chronic Myeloid Leukemia (CML) Chronic Myeloid Leukemia (CML) Chronic Myeloid Leukemia (CML) Chronic Myeloid Leukemia (CML) Chronic Myeloid Leukemia (CML) Chronic Myeloid Leukemia (CML)   Providers Dr. Lara Aldana? Dr. Aldana? Dr. Aldana?   [facility]   Drug Name Gleevec (imatinib) Gleevec (imatinib) Gleevec (imatinib) Gleevec (imatinib) Gleevec (imatinib) Gleevec (imatinib) Gleevec (imatinib)   Dose 400 mg 400 mg 400 mg 400 mg 400 mg 400 mg 400 mg   Current Schedule Daily Daily Daily Daily Daily Daily Daily   Cycle Details Continuous Continuous Continuous Continuous - - Continuous   Start Date of Last Cycle - - - - - - -   Planned next cycle start date 4/9/2021 - - - - - -   Doses missed in last 2 weeks - - - - 0 - 0   Adherence Assessment Adherent - - - Adherent - Adherent   Reason for Non-adherence - - - - - - -  "  Adherence Intervention Recommended - - - - - - -   Adverse Effects No AE identified during assessment - - - No AE identified during assessment - Other (See Note for Details)   Pharmacist Intervention(nausea) - - - - - - No   Any new drug interactions? No - - - No - No   Is the dose as ordered appropriate for the patient? Yes - - - Yes - Yes   Has the patient been assessed within the past 6 months for depression? - - - - - - -   Has the patient missed any days of school, work, or other routine activity? - - - - - - -       Last PHQ-2 Score on record: No flowsheet data found.    Vitals:  BP:   BP Readings from Last 1 Encounters:   05/11/21 118/74     Wt Readings from Last 1 Encounters:   05/11/21 77.1 kg (170 lb)     Estimated body surface area is 1.92 meters squared as calculated from the following:    Height as of 10/8/19: 1.73 m (5' 8.11\").    Weight as of 5/11/21: 77.1 kg (170 lb).    Labs:  No results found for NA within last 30 days.     No results found for K within last 30 days.     No results found for CA within last 30 days.     No results found for Mag within last 30 days.     No results found for Phos within last 30 days.     No results found for ALBUMIN within last 30 days.     No results found for BUN within last 30 days.     No results found for CR within last 30 days.     No results found for AST within last 30 days.     No results found for ALT within last 30 days.     No results found for BILITOTAL within last 30 days.     No results found for WBC within last 30 days.     No results found for HGB within last 30 days.     No results found for PLT within last 30 days.     No results found for ANC within last 30 days.       Assessment/Plan:  Easton is tolerating Gleevec well. PDC=0.98, no concerns with adherence.      Follow-Up:  12/01: [facility] appt  2/08/21: quarterly assessment    Refill Due:  [facility] will deliver Gleevec on 11/12.     Corazon Romano PharmD  Hematology/Oncology Clinical " Pharmacist  [facility]  [facility]  [phone]

## 2021-12-02 ENCOUNTER — ONCOLOGY VISIT (OUTPATIENT)
Dept: ONCOLOGY | Facility: CLINIC | Age: 36
End: 2021-12-02
Attending: INTERNAL MEDICINE
Payer: COMMERCIAL

## 2021-12-02 ENCOUNTER — APPOINTMENT (OUTPATIENT)
Dept: LAB | Facility: CLINIC | Age: 36
End: 2021-12-02
Attending: PHYSICIAN ASSISTANT
Payer: COMMERCIAL

## 2021-12-02 VITALS
RESPIRATION RATE: 18 BRPM | TEMPERATURE: 98.5 F | DIASTOLIC BLOOD PRESSURE: 78 MMHG | WEIGHT: 175 LBS | OXYGEN SATURATION: 97 % | HEART RATE: 77 BPM | SYSTOLIC BLOOD PRESSURE: 115 MMHG | BODY MASS INDEX: 26.52 KG/M2

## 2021-12-02 DIAGNOSIS — C92.10 CML (CHRONIC MYELOCYTIC LEUKEMIA) (H): Primary | ICD-10-CM

## 2021-12-02 DIAGNOSIS — R79.0 LOW SERUM PHOSPHORUS FOR AGE: ICD-10-CM

## 2021-12-02 LAB
ALBUMIN SERPL-MCNC: 4.2 G/DL (ref 3.4–5)
ALP SERPL-CCNC: 55 U/L (ref 40–150)
ALT SERPL W P-5'-P-CCNC: 33 U/L (ref 0–70)
ANION GAP SERPL CALCULATED.3IONS-SCNC: 5 MMOL/L (ref 3–14)
AST SERPL W P-5'-P-CCNC: 20 U/L (ref 0–45)
BASOPHILS # BLD AUTO: 0 10E3/UL (ref 0–0.2)
BASOPHILS NFR BLD AUTO: 0 %
BILIRUB SERPL-MCNC: 0.7 MG/DL (ref 0.2–1.3)
BUN SERPL-MCNC: 12 MG/DL (ref 7–30)
CALCIUM SERPL-MCNC: 9.2 MG/DL (ref 8.5–10.1)
CHLORIDE BLD-SCNC: 107 MMOL/L (ref 94–109)
CO2 SERPL-SCNC: 27 MMOL/L (ref 20–32)
CREAT SERPL-MCNC: 0.75 MG/DL (ref 0.66–1.25)
EOSINOPHIL # BLD AUTO: 0 10E3/UL (ref 0–0.7)
EOSINOPHIL NFR BLD AUTO: 1 %
ERYTHROCYTE [DISTWIDTH] IN BLOOD BY AUTOMATED COUNT: 13.2 % (ref 10–15)
GFR SERPL CREATININE-BSD FRML MDRD: >90 ML/MIN/1.73M2
GLUCOSE BLD-MCNC: 88 MG/DL (ref 70–99)
HCT VFR BLD AUTO: 40.3 % (ref 40–53)
HGB BLD-MCNC: 13.7 G/DL (ref 13.3–17.7)
IMM GRANULOCYTES # BLD: 0 10E3/UL
IMM GRANULOCYTES NFR BLD: 0 %
LAB DIRECTOR COMMENTS: NORMAL
LAB DIRECTOR DISCLAIMER: NORMAL
LAB DIRECTOR INTERPRETATION: NORMAL
LAB DIRECTOR METHODOLOGY: NORMAL
LAB DIRECTOR RESULTS: NORMAL
LYMPHOCYTES # BLD AUTO: 2.3 10E3/UL (ref 0.8–5.3)
LYMPHOCYTES NFR BLD AUTO: 49 %
MCH RBC QN AUTO: 32.4 PG (ref 26.5–33)
MCHC RBC AUTO-ENTMCNC: 34 G/DL (ref 31.5–36.5)
MCV RBC AUTO: 95 FL (ref 78–100)
MONOCYTES # BLD AUTO: 0.4 10E3/UL (ref 0–1.3)
MONOCYTES NFR BLD AUTO: 9 %
NEUTROPHILS # BLD AUTO: 1.9 10E3/UL (ref 1.6–8.3)
NEUTROPHILS NFR BLD AUTO: 41 %
NRBC # BLD AUTO: 0 10E3/UL
NRBC BLD AUTO-RTO: 0 /100
PHOSPHATE SERPL-MCNC: 1.8 MG/DL (ref 2.5–4.5)
PLATELET # BLD AUTO: 180 10E3/UL (ref 150–450)
POTASSIUM BLD-SCNC: 4.2 MMOL/L (ref 3.4–5.3)
PROT SERPL-MCNC: 7.1 G/DL (ref 6.8–8.8)
RBC # BLD AUTO: 4.23 10E6/UL (ref 4.4–5.9)
SODIUM SERPL-SCNC: 139 MMOL/L (ref 133–144)
SPECIMEN DESCRIPTION: NORMAL
WBC # BLD AUTO: 4.6 10E3/UL (ref 4–11)

## 2021-12-02 PROCEDURE — 36415 COLL VENOUS BLD VENIPUNCTURE: CPT | Performed by: PHYSICIAN ASSISTANT

## 2021-12-02 PROCEDURE — 84100 ASSAY OF PHOSPHORUS: CPT | Performed by: PHYSICIAN ASSISTANT

## 2021-12-02 PROCEDURE — 82040 ASSAY OF SERUM ALBUMIN: CPT | Performed by: PHYSICIAN ASSISTANT

## 2021-12-02 PROCEDURE — G0463 HOSPITAL OUTPT CLINIC VISIT: HCPCS

## 2021-12-02 PROCEDURE — 81206 BCR/ABL1 GENE MAJOR BP: CPT | Performed by: PHYSICIAN ASSISTANT

## 2021-12-02 PROCEDURE — 99214 OFFICE O/P EST MOD 30 MIN: CPT | Performed by: PHYSICIAN ASSISTANT

## 2021-12-02 PROCEDURE — 85025 COMPLETE CBC W/AUTO DIFF WBC: CPT | Performed by: PHYSICIAN ASSISTANT

## 2021-12-02 ASSESSMENT — PAIN SCALES - GENERAL: PAINLEVEL: NO PAIN (0)

## 2021-12-02 NOTE — Clinical Note
12/2/2021         RE: Easton Pardo  [address]  [place] [address]        Dear Colleague,    Thank you for referring your patient, Easton Pardo, to the [facility]. Please see a copy of my visit note below.    Disease and Treatment History:  1. Presented in 10/2009 with abdominal fullness, profound fatigue, SOB and found to have splenomegaly, elevated WBC, anemia, and retained platelet count  2. 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 Imatinib started Fall 2009  -- CBC quickly normalized  -- Cytogenetic remission obtained (unclear date)  -- Major molecular response as of 2011  -- Molecular remission noted in 6/2012  4. Transfer of care to [facility] in 10/2012 and has been followed there until 6/2017. Waxing and waning p210 and p190 levels all < 0.006% on the IS. Last checked there on 6/2017:  -- P210 0.001% IS  -- P190: 0.0001%  5. Moved to [place] 6/2017  6. 6/2019 BCR-ABL p190 undetectable; BCR-ABL p210 <0.043%  7. 2/2020 BCR-ABL p190 undetectable; BCR-ABL undetectable  8. 8/2020 BCR-ABL p210: BCR-ABL1/ABL1 ratio of <0.039%; BCR-ABL: p190: undetectable  9. 12/20 BCR-ABL: p190: undetectable  10. 5/21 BCR-ABL1/ABL1 ratio of <0.038%      HPI:   Easton is a 36 year old male who comes in today for CML follow-up. He notes that he stopped taking the phosphorous supplementations completely. He prefers the packet of phosphorous but was sent PO. Occasional muscles cramps. Notes no infections. Overall doing well. Continues working from home. He has been taking Gleevec regularly at 400 mg daily with food (splitting to 200mg and 100mg with 30 mins apart, and then another one 100 mg 30 min-1 hour later) and occasionally missed 1 of the 4 doses.     He has not had any significant side effects from the Gleevec but does have concerns about bilateral big toenail horizontal lines that he started noticing the last  couple of months. He recalls trauma to one of his big toes but not the other. Toes are non-tender. Has noticed blood blisters over the last two years on his hands that he thinks is related to the Gleevec and frequent hand washing. Additionally, he has some intermittent symptoms that are overall non-bothersome to him and he correlates them to deconditioning along with poor body posture. About ~1 week ago, 2 days of chest pain towards the right side that felt muscular in nature. Now resolved. He also had one night of sweats x 2 weeks ago, but states he drank alcohol as well and thinks it was correlate to this. He has not any since then. Endorses ~2 weeks ago, 2 days of LLQ cramping pain. Has had occasional nausea some mornings, but it tends to correlate with drinking coffee without breakfast. Restarted Vitamin K2 and Vitamin D due to decreased activity as of late.     Sleep is poor due to working late nights and stress with work. He does take melatonin which helps to fall asleep nightly but he easily wake during the night. He states his mood is good. No fevers or chills, SOB, no diarrhea or constipation, no vomiting, no weakness, no new myalgias or arthralgias. No new rashes, lumps, and bumps.     ROS:   10 point ROS of systems including Constitutional, Eyes, Respiratory, Cardiovascular, Gastroenterology, Genitourinary, Integumentary, Musculoskeletal, Psychiatric were all negative except for pertinent positives noted in my HPI.    Physical Exam:  General: The patient is a pleasant male in no acute distress.  /78   Pulse 77   Temp 98.5  F (36.9  C)   Resp 18   Wt 79.4 kg (175 lb)   SpO2 97%   BMI 26.52 kg/m    Wt Readings from Last 10 Encounters:   12/02/21 79.4 kg (175 lb)   05/11/21 77.1 kg (170 lb)   08/03/20 66.3 kg (146 lb 1.6 oz)   02/06/20 80.7 kg (177 lb 14.4 oz)   10/08/19 79.3 kg (174 lb 14.4 oz)   06/18/19 83.5 kg (184 lb)   03/19/19 83.5 kg (184 lb 1.6 oz)   12/06/18 86.7 kg (191 lb 1.6 oz)    06/07/18 79.5 kg (175 lb 3.2 oz)   HEENT: EOMI, PERRL. Sclerae are anicteric. Oral mucosa is pink and moist with no lesions or thrush.   Lymph: Neck is supple with no lymphadenopathy in the cervical or supraclavicular areas.   Heart: Regular rate and rhythm.   Lungs: Clear to auscultation bilaterally.   Abdomen: Bowel sounds present, soft, nontender with no palpable hepatosplenomegaly or masses.   Extremities: No lower extremity edema noted bilaterally.   Neuro: Cranial nerves II through XII are grossly intact.  Skin: No rashes, petechiae, or bruising noted on exposed skin. Bilateral raised big toenail ridges with horizontal white lines. Toenail is intact to the nail bed. Non-tender.     Labs:  Most Recent 3 CBC's:Recent Labs   Lab Test 12/02/21  1417 05/11/21  1502 12/08/20  1149   WBC 4.6 4.0 5.8   HGB 13.7 13.9 14.6   MCV 95 94 93    175 162     Most Recent 3 BMP's:  Recent Labs   Lab Test 05/11/21  1502 12/08/20  1149 08/03/20  1501    137 137   POTASSIUM 4.3 4.7 4.5   CHLORIDE 107 107 105   CO2 30 28 27   BUN 11 13 13   CR 0.81 0.89 0.82   ANIONGAP 4 2* 6   JUVENAL 9.0 8.8 9.2   GLC 97 89 85    Most Recent 2 LFT's:  Recent Labs   Lab Test 05/11/21  1502 12/08/20  1149   AST 30 28   ALT 63 51   ALKPHOS 59 67   BILITOTAL 0.5 0.7    Most Recent TSH and T4:No lab results found.  I reviewed the above labs today.    Assessment and Plan:  Easton is a 36 year old man with CP CML since 2009 with molecular remission on imatinib. Overall tolerating imatinib well.     1. CML  Currently on imatinib 400 mg daily (splitting to 200mg and 100mg within 30 mins and the last 100 within 30 min-1hr apart due to nausea). Tolerating well. Occasionally missed some of his pills, but does get a least a portion of his dose daily. CBC and CMP stable today. Repeat P210 and P190 pending today, will contact Easton with results once finalized.   - continue with imatinib 400 mg daily, encouraged trying to getting full dose daily.    - Follow up in 6 months with Dr. Aldana for labs and CML recheck.      2. Bilateral toenail changes  Cause for changes unclear, could be related to trauma, fungal. Beau lines are a rare side effects seen with imatinib. I am not overall concerned with these changes at this time. Recommend continued observation.     3. Hypophosphatemia  Has a history of intermittent need for phos repletion. He has not been taking phosphate supplementations regularly at home. He prefers the packet of phosphate compared to the tablet forms. Will follow up on phosphate levels once received.  - If phosphate is >2, will not proceed with supplementation. If less than 2, will recommend restarting supplements.      4. Need for PCP  Recommend a PCP for intermittent symptoms as these are non-alarming at this time and I do not think these are correlated to disease progression given reassuring CBC and CMP at this time. Assistance was offered to help with establishment. Patient opts to find PCP on his own.     5. Health care maintenance.  Eye exams: Recommend exams at least every 2 years. Patient has not seen a provider recently.   Dental exams: Recommend exams and cleanings every 6 months. Patient has not seen a dentist recently.   Primary care: Recommend follow up at least annually. Does not have a PCP.   Skin care: Recommend the use of sunscreen with SPF of at least 30, reapplying every 2 hours with prolonged sun exposure.  Tobacco use: Recommend abstaining.  Alcohol use: Recommend no more than 1/day for women and 2/day for men. Drinks alcohol intermittently. 1-3 drinks in a sitting. If he drinks, he tends to drink 7-10 beers in a week.   Physical activity: Recommend regular activity, ideally 150 minutes/week of moderate intensity activity. Enjoys hiking, has not been as active as of late.     ZARI Nguyen-S     Addendum     Recently laboratory results with continued hypophosphatemia, 1.8 on 12/2/21. Patient has need been taking phosphate  supplementation regularly due to tablet not being ideal. Rx sent for [facility] mail order with phosphorus power to be taken every day. Patient updated and reviewed plan. Patient agrees with plan. Will recheck at his next visit in 3 months and adjust plan as needed.      POLO Nguyen             Again, thank you for allowing me to participate in the care of your patient.        Sincerely,        Corazon Andrews PA-C

## 2021-12-02 NOTE — NURSING NOTE
Chief Complaint   Patient presents with     Labs Only     venipuncture, vitals checked     Dianna Haney RN on 12/2/2021 at 2:19 PM

## 2021-12-02 NOTE — NURSING NOTE
"Oncology Rooming Note    December 2, 2021 2:37 PM   Easton Pardo is a 36 year old male who presents for:    Chief Complaint   Patient presents with     Labs Only     venipuncture, vitals checked     Oncology Clinic Visit     Pt is here for a rtn CML      Initial Vitals: Blood Pressure 115/78   Pulse 77   Temperature 98.5  F (36.9  C)   Respiration 18   Weight 79.4 kg (175 lb)   Oxygen Saturation 97%   Body Mass Index 26.52 kg/m   Estimated body mass index is 26.52 kg/m  as calculated from the following:    Height as of 10/8/19: 1.73 m (5' 8.11\").    Weight as of this encounter: 79.4 kg (175 lb). Body surface area is 1.95 meters squared.  No Pain (0) Comment: Data Unavailable   No LMP for male patient.  Allergies reviewed: Yes  Medications reviewed: Yes    Medications: Medication refills not needed today.  Pharmacy name entered into [employer]: [facility] - [place], [place] - [address]    Clinical concerns: none       Yolis Amanda MA            "

## 2021-12-02 NOTE — PROGRESS NOTES
Disease and Treatment History:  1. Presented in 10/2009 with abdominal fullness, profound fatigue, SOB and found to have splenomegaly, elevated WBC, anemia, and retained platelet count  2. 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 Imatinib started Fall 2009  -- CBC quickly normalized  -- Cytogenetic remission obtained (unclear date)  -- Major molecular response as of 2011  -- Molecular remission noted in 6/2012  4. Transfer of care to [facility] in 10/2012 and has been followed there until 6/2017. Waxing and waning p210 and p190 levels all < 0.006% on the IS. Last checked there on 6/2017:  -- P210 0.001% IS  -- P190: 0.0001%  5. Moved to [place] 6/2017  6. 6/2019 BCR-ABL p190 undetectable; BCR-ABL p210 <0.043%  7. 2/2020 BCR-ABL p190 undetectable; BCR-ABL undetectable  8. 8/2020 BCR-ABL p210: BCR-ABL1/ABL1 ratio of <0.039%; BCR-ABL: p190: undetectable  9. 12/20 BCR-ABL: p190: undetectable  10. 5/21 BCR-ABL1/ABL1 ratio of <0.038%      HPI:   Easton is a 36 year old male who comes in today for CML follow-up. He notes that he stopped taking the phosphorous supplementations completely. He prefers the packet of phosphorous but was sent PO. Occasional muscles cramps. Notes no infections. Overall doing well. Continues working from home. He has been taking Gleevec regularly at 400 mg daily with food (splitting to 200mg and 100mg with 30 mins apart, and then another one 100 mg 30 min-1 hour later) and occasionally missed 1 of the 4 doses.     He has not had any significant side effects from the Gleevec but does have concerns about bilateral big toenail horizontal lines that he started noticing the last couple of months. He recalls trauma to one of his big toes but not the other. Toes are non-tender. Has noticed blood blisters over the last two years on his hands that he thinks is related to the Gleevec and frequent hand washing. Additionally, he has some intermittent symptoms that are  overall non-bothersome to him and he correlates them to deconditioning along with poor body posture. About ~1 week ago, 2 days of chest pain towards the right side that felt muscular in nature. Now resolved. He also had one night of sweats x 2 weeks ago, but states he drank alcohol as well and thinks it was correlate to this. He has not any since then. Endorses ~2 weeks ago, 2 days of LLQ cramping pain. Has had occasional nausea some mornings, but it tends to correlate with drinking coffee without breakfast. Restarted Vitamin K2 and Vitamin D due to decreased activity as of late.     Sleep is poor due to working late nights and stress with work. He does take melatonin which helps to fall asleep nightly but he easily wake during the night. He states his mood is good. No fevers or chills, SOB, no diarrhea or constipation, no vomiting, no weakness, no new myalgias or arthralgias. No new rashes, lumps, and bumps.     ROS:   10 point ROS of systems including Constitutional, Eyes, Respiratory, Cardiovascular, Gastroenterology, Genitourinary, Integumentary, Musculoskeletal, Psychiatric were all negative except for pertinent positives noted in my HPI.    Physical Exam:  General: The patient is a pleasant male in no acute distress.  /78   Pulse 77   Temp 98.5  F (36.9  C)   Resp 18   Wt 79.4 kg (175 lb)   SpO2 97%   BMI 26.52 kg/m    Wt Readings from Last 10 Encounters:   12/02/21 79.4 kg (175 lb)   05/11/21 77.1 kg (170 lb)   08/03/20 66.3 kg (146 lb 1.6 oz)   02/06/20 80.7 kg (177 lb 14.4 oz)   10/08/19 79.3 kg (174 lb 14.4 oz)   06/18/19 83.5 kg (184 lb)   03/19/19 83.5 kg (184 lb 1.6 oz)   12/06/18 86.7 kg (191 lb 1.6 oz)   06/07/18 79.5 kg (175 lb 3.2 oz)   HEENT: EOMI, PERRL. Sclerae are anicteric. Oral mucosa is pink and moist with no lesions or thrush.   Lymph: Neck is supple with no lymphadenopathy in the cervical or supraclavicular areas.   Heart: Regular rate and rhythm.   Lungs: Clear to auscultation  bilaterally.   Abdomen: Bowel sounds present, soft, nontender with no palpable hepatosplenomegaly or masses.   Extremities: No lower extremity edema noted bilaterally.   Neuro: Cranial nerves II through XII are grossly intact.  Skin: No rashes, petechiae, or bruising noted on exposed skin. Bilateral raised big toenail ridges with horizontal white lines. Toenail is intact to the nail bed. Non-tender.     Labs:  Most Recent 3 CBC's:Recent Labs   Lab Test 12/02/21  1417 05/11/21  1502 12/08/20  1149   WBC 4.6 4.0 5.8   HGB 13.7 13.9 14.6   MCV 95 94 93    175 162     Most Recent 3 BMP's:  Recent Labs   Lab Test 05/11/21  1502 12/08/20  1149 08/03/20  1501    137 137   POTASSIUM 4.3 4.7 4.5   CHLORIDE 107 107 105   CO2 30 28 27   BUN 11 13 13   CR 0.81 0.89 0.82   ANIONGAP 4 2* 6   JUVENAL 9.0 8.8 9.2   GLC 97 89 85    Most Recent 2 LFT's:  Recent Labs   Lab Test 05/11/21  1502 12/08/20  1149   AST 30 28   ALT 63 51   ALKPHOS 59 67   BILITOTAL 0.5 0.7    Most Recent TSH and T4:No lab results found.  I reviewed the above labs today.    Assessment and Plan:  Easton is a 36 year old man with CP CML since 2009 with molecular remission on imatinib. Overall tolerating imatinib well.     1. CML  Currently on imatinib 400 mg daily (splitting to 200mg and 100mg within 30 mins and the last 100 within 30 min-1hr apart due to nausea). Tolerating well. Occasionally missed some of his pills, but does get a least a portion of his dose daily. CBC and CMP stable today. Repeat P210 and P190 pending today, will contact Easton with results once finalized.   - continue with imatinib 400 mg daily, encouraged trying to getting full dose daily.   - Follow up in 6 months with Dr. Aldana for labs and CML recheck.      2. Bilateral toenail changes  Cause for changes unclear, could be related to trauma, fungal. Beau lines are a rare side effects seen with imatinib. I am not overall concerned with these changes at this time. Recommend  continued observation.     3. Hypophosphatemia  Has a history of intermittent need for phos repletion. He has not been taking phosphate supplementations regularly at home. He prefers the packet of phosphate compared to the tablet forms. Will follow up on phosphate levels once received.  - If phosphate is >2, will not proceed with supplementation. If less than 2, will recommend restarting supplements.      4. Need for PCP  Recommend a PCP for intermittent symptoms as these are non-alarming at this time and I do not think these are correlated to disease progression given reassuring CBC and CMP at this time. Assistance was offered to help with establishment. Patient opts to find PCP on his own.     5. Health care maintenance.  Eye exams: Recommend exams at least every 2 years. Patient has not seen a provider recently.   Dental exams: Recommend exams and cleanings every 6 months. Patient has not seen a dentist recently.   Primary care: Recommend follow up at least annually. Does not have a PCP.   Skin care: Recommend the use of sunscreen with SPF of at least 30, reapplying every 2 hours with prolonged sun exposure.  Tobacco use: Recommend abstaining.  Alcohol use: Recommend no more than 1/day for women and 2/day for men. Drinks alcohol intermittently. 1-3 drinks in a sitting. If he drinks, he tends to drink 7-10 beers in a week.   Physical activity: Recommend regular activity, ideally 150 minutes/week of moderate intensity activity. Enjoys hiking, has not been as active as of late.     ADALBERTO NguyenS     Addendum     Recently laboratory results with continued hypophosphatemia, 1.8 on 12/2/21. Patient has need been taking phosphate supplementation regularly due to tablet not being ideal. Rx sent for [facility] mail order with phosphorus power to be taken every day. Patient updated and reviewed plan. Patient agrees with plan. Will recheck at his next visit in 3 months and adjust plan as needed.       POLO Nguyen     The patient was seen in conjunction with POLO Nguyen who served as a scribe for today's visit. I have reviewed and edited the above note, and agree with the above findings and plan.  Corazon Andrews PA-C

## 2021-12-06 PROCEDURE — G0452 MOLECULAR PATHOLOGY INTERPR: HCPCS | Mod: 26 | Performed by: PATHOLOGY

## 2022-01-03 DIAGNOSIS — C92.10 CML (CHRONIC MYELOCYTIC LEUKEMIA) (H): Primary | ICD-10-CM

## 2022-01-03 RX ORDER — IMATINIB MESYLATE 100 MG/1
400 TABLET, FILM COATED ORAL DAILY
Qty: 120 TABLET | Refills: 3 | Status: SHIPPED | OUTPATIENT
Start: 2022-01-03 | End: 2022-12-30

## 2022-03-03 ENCOUNTER — TELEPHONE (OUTPATIENT)
Dept: ONCOLOGY | Facility: CLINIC | Age: 37
End: 2022-03-03
Payer: COMMERCIAL

## 2022-03-03 NOTE — TELEPHONE ENCOUNTER
Oral Chemotherapy Monitoring Program     Placed call to patient in follow up of oral chemotherapy and to request patient schedule labs. Left message requesting call back. No drug names were mentioned. Will update when response received.     Sotero Spence, PharmD  PGY2 Hematology/Oncology Pharmacy Resident  [facility]

## 2022-03-07 NOTE — TELEPHONE ENCOUNTER
Oral Chemotherapy Monitoring Program     Placed call #2 to Easton Pardo in follow up of Gleevec oral chemotherapy.     Left a message requesting a call back. No drug names were mentioned in the voicemail. Will update when response is received.      Fabi Beard, PharmD, BCACP  Oral Chemotherapy Monitoring Program  [facility]  [phone]  March 7, 2022

## 2022-03-09 NOTE — TELEPHONE ENCOUNTER
Oral Chemotherapy Monitoring Program     Placed call #3 to Easton Pardo in follow up of Gleevec oral chemotherapy.     Left a message requesting a call back. No drug names were mentioned in the voicemail. Will update when response is received.      Fabi Beard, PharmD, BCACP  Oral Chemotherapy Monitoring Program  [facility]  [phone]  March 9, 2022

## 2022-03-11 ENCOUNTER — TELEPHONE (OUTPATIENT)
Dept: ONCOLOGY | Facility: CLINIC | Age: 37
End: 2022-03-11
Payer: COMMERCIAL

## 2022-03-11 NOTE — ORAL ONC MGMT
Oral Chemotherapy Monitoring Program    Subjective/Objective:  Easton Pardo is a 36 year old male contacted by phone for a follow-up visit for oral chemotherapy.  Easton confirms taking imatinib 400 mg daily. He has been on this for many years and continues to tolerate well, with no new or worsening adverse effects. He denies any recent medication changes or missed doses of imatinib. PDC 0.98 today, no concerns for adherence at this time.     ORAL CHEMOTHERAPY 9/8/2021 11/8/2021 12/3/2021 3/3/2022 3/7/2022 3/9/2022 3/11/2022   Assessment Type Refill Quarterly Follow up Chart Review Left Voicemail Left Voicemail - Quarterly Follow up   Diagnosis Code Chronic Myeloid Leukemia (CML) Chronic Myeloid Leukemia (CML) Chronic Myeloid Leukemia (CML) Chronic Myeloid Leukemia (CML) Chronic Myeloid Leukemia (CML) Chronic Myeloid Leukemia (CML) Chronic Myeloid Leukemia (CML)   Providers Dr. Aldana? Dr. Aldana? Dr. Katya Aldana   [facility]   Drug Name Gleevec (imatinib) Gleevec (imatinib) Gleevec (imatinib) Gleevec (imatinib) Gleevec (imatinib) Gleevec (imatinib) Gleevec (imatinib)   Dose 400 mg 400 mg 400 mg 400 mg 400 mg 400 mg 400 mg   Current Schedule Daily Daily Daily Daily Daily Daily Daily   Cycle Details - Continuous Continuous Continuous Continuous Continuous Continuous   Start Date of Last Cycle - - - - - - -   Planned next cycle start date - - - - - - 3/18/2022   Doses missed in last 2 weeks - 0 - - - - 0   Adherence Assessment - Adherent - - - - Adherent   Reason for Non-adherence - - - - - - -   Adherence Intervention Recommended - - - - - - -   Adverse Effects - Other (See Note for Details) - - - - No AE identified during assessment   Pharmacist Intervention(nausea) - No - - - - -   Any new drug interactions? - No - - - - No   Is the dose as ordered appropriate for the patient? - Yes - - - - Yes   Has the  "patient been assessed within the past 6 months for depression? - - - - - - -   Has the patient missed any days of school, work, or other routine activity? - - - - - - -       Last PHQ-2 Score on record: No flowsheet data found.    Vitals:  BP:   BP Readings from Last 1 Encounters:   12/02/21 115/78     Wt Readings from Last 1 Encounters:   12/02/21 79.4 kg (175 lb)     Estimated body surface area is 1.95 meters squared as calculated from the following:    Height as of 10/8/19: 1.73 m (5' 8.11\").    Weight as of 12/2/21: 79.4 kg (175 lb).    Labs:  No results found for NA within last 30 days.     No results found for K within last 30 days.     No results found for CA within last 30 days.     No results found for Mag within last 30 days.     No results found for Phos within last 30 days.     No results found for ALBUMIN within last 30 days.     No results found for BUN within last 30 days.     No results found for CR within last 30 days.     No results found for AST within last 30 days.     No results found for ALT within last 30 days.     No results found for BILITOTAL within last 30 days.     No results found for WBC within last 30 days.     No results found for HGB within last 30 days.     No results found for PLT within last 30 days.     No results found for ANC within last 30 days.     No results found for ANC within last 30 days.          Assessment/Plan:  Easton continues to tolerate imatinib well. Continue current therapy.     Follow-Up:  6/10: [facility] appt    Refill Due:  [facility] will deliver imatinib on 3/15 (will need by ~3/18)    Aleyda Vidal, PharmD, BCOP  Hematology/Oncology Clinical Pharmacist  [facility]  [facility]  [phone]      "

## 2022-03-21 ENCOUNTER — TELEPHONE (OUTPATIENT)
Dept: ONCOLOGY | Facility: CLINIC | Age: 37
End: 2022-03-21
Payer: COMMERCIAL

## 2022-03-21 NOTE — TELEPHONE ENCOUNTER
Prior Authorization Approval    Authorization Effective Date: 2/19/2022  Authorization Expiration Date: 3/21/2023  Medication: Gleevec PA Approved  Approved Dose/Quantity: 120 for 30 days  Reference #:     Insurance Company:    Expected CoPay:       CoPay Card Available:      Foundation Assistance Needed:    Which Pharmacy is filling the prescription (Not needed for infusion/clinic administered):    Pharmacy Notified:    Patient Notified:      Not sure who initiated the PA, but I received the approval.          Michelle Griffin  Oncology Pharmacy Liaison II  [email or web address]  Phone: [phone]  Fax: [phone]

## 2022-05-06 DIAGNOSIS — C92.10 CML (CHRONIC MYELOCYTIC LEUKEMIA) (H): Primary | ICD-10-CM

## 2022-05-06 RX ORDER — IMATINIB MESYLATE 100 MG/1
400 TABLET, FILM COATED ORAL DAILY
Qty: 120 TABLET | Refills: 3 | Status: SHIPPED | OUTPATIENT
Start: 2022-05-06 | End: 2022-12-30

## 2022-05-21 ENCOUNTER — HEALTH MAINTENANCE LETTER (OUTPATIENT)
Age: 37
End: 2022-05-21

## 2022-06-05 NOTE — PROGRESS NOTES
Easton is a 36 year old who is being evaluated via a billable video visit.      How would you like to obtain your AVS? MyChart  If the video visit is dropped, the invitation should be resent by: Send to e-mail at: [email or web address]  Will anyone else be joining your video visit? No       Francisca Bourgeois VF    Video Start Time:  Video-Visit Details    Type of service:  Video Visit  Video Start Time: 1250 PM  Video End Time: 115 PM    Originating Location (pt. Location): Home    Distant Location (provider location):  [facility]     Platform used for Video Visit: [facility]  June 5, 2022  DIAGNOSES: CML-CP  TREATMENT: Imatinib    Hem/onc History:   Today is my first day meeting Mr. Wade. He is doing quite well. Works from home as a . Not getting enough sleep, but energy level is stable outside of that. No weight changes, early satiety, or other concerns.      History of Present Illness:     1. Presented in 10/2009 with abdominal fullness, profound fatigue, SOB and found to have splenomegaly, elevated WBC, anemia, and retained platelet count  2. 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 Imatinib started Fall 2009  -- CBC quickly normalized  -- Cytogenetic remission obtained (unclear date)  -- Major molecular response as of 2011  -- Molecular remission noted in 6/2012  4. Transfer of care to [facility] in 10/2012 and has been followed there until 6/2017. Waxing and waning p210 and p190 levels all < 0.006% on the IS. Last checked there on 6/2017:  -- P210 0.001% IS  -- P190: 0.0001%  5. Moved to [place] 6/2017  6. 6/2019 BCR-ABL p190 undetectable; BCR-ABL p210 <0.043%  7. 2/2020 BCR-ABL p190 undetectable; BCR-ABL undetectable  8. 8/2020 BCR-ABL p210: BCR-ABL1/ABL1 ratio of <0.039%; BCR-ABL: p190: undetectable  9. 12/20 BCR-ABL: p190: undetectable  10. 5/21 BCR-ABL1/ABL1 ratio of  <0.038%      Medications:       Current Outpatient Medications   Medication Sig     imatinib (GLEEVEC) 100 MG tablet Take 4 tablets (400 mg) by mouth daily Take with a meal and a large glass of water.     imatinib (GLEEVEC) 100 MG tablet Take 4 tablets (400 mg) by mouth daily Take with a meal and a large glass of water.     imatinib (GLEEVEC) 100 MG tablet Take 4 tablets (400 mg) by mouth daily Take with a meal and a large glass of water.     imatinib (GLEEVEC) 100 MG tablet Take 4 tablets (400 mg) by mouth daily Take with a meal and a large glass of water.     melatonin 5 MG CAPS Take 5 mg by mouth as needed     Menaquinone-7 (VITAMIN K2 PO) Take 75 mcg by mouth     Vitamin D, Cholecalciferol, 25 MCG (1000 UT) CAPS Take 2,000 mcg by mouth     potassium & sodium phosphates (NEUTRA-PHOS) 280-160-250 MG Packet Take 1 packet by mouth daily (Patient not taking: Reported on 6/8/2022)     No current facility-administered medications for this visit.          Physical Exam:     Physical Exam conducted via video conference:  General: Appears well, no acute distress.  Seated during interview. Able to ambulate without obvious deficit.  Skin: No lesions or rashes on face.  Head and Neck:  No visible JVP. No masses.  ROM at neck without deficit.   EENT: EOMI. No visible nystagmus. No lid lesions.  Anicteric.  Voice without hoarseness. No gross hearing deficits.   Pulmonary: Respiratory rate ~16.  No audible wheezing or stridor. No cough.  Normal work of breathing.   Heart: Acyanotic.  No visible lower extremity edema.   Abdomen: Nondistended. No visible abnormal veinous patterns.   MS: ROM without deficit in upper and lower extremities  Psych:  Affect is appropriate.  No psychomotor agitation  Data:     I personally reviewed the following studies:    Recent Labs   Lab Test 12/02/21  1417 05/11/21  1502 12/08/20  1149 08/03/20  1501 02/06/20  1323   WBC 4.6 4.0 5.8 5.5 5.3   NEUTROPHIL 41 43.4 48.6 52.6 46.7   HGB 13.7 13.9 14.6  14.0 14.0    175 162 196 195     Recent Labs   Lab Test 12/02/21  1417 05/11/21  1502 12/08/20  1149 08/03/20  1501 02/06/20  1323    141 137 137 137   POTASSIUM 4.2 4.3 4.7 4.5 4.5   CHLORIDE 107 107 107 105 107   CO2 27 30 28 27 28   ANIONGAP 5 4 2* 6 2*   BUN 12 11 13 13 10   CR 0.75 0.81 0.89 0.82 0.73   JUVENAL 9.2 9.0 8.8 9.2 9.3     Recent Labs   Lab Test 12/02/21  1417 05/11/21  1502 12/08/20  1149 08/03/20  1501 02/06/20  1323 10/18/19  1127 10/08/19  1051   MAG  --  2.2  --   --  2.2  --  2.2   PHOS 1.8* 2.3* 2.1*   < > 2.3*   < > 1.8*    < > = values in this interval not displayed.     Recent Labs   Lab Test 12/02/21  1417 05/11/21  1502 12/08/20  1149   BILITOTAL 0.7 0.5 0.7   ALKPHOS 55 59 67   ALT 33 63 51   AST 20 30 28   ALBUMIN 4.2 4.2 4.3       No results found for this or any previous visit (from the past 24 hour(s)).    Assessment and Plan:     Easton is a 36 year old man with CP CML since 2009 with molecular remission on imatinib. Overall tolerating imatinib well.     1. CML-CP in remission  Currently on imatinib 400 mg daily (splitting to 200mg and 100mg within 30 mins and the last 100 within 30 min-1hr apart due to nausea). Tolerating well. Occasionally missed some of his pills, but does get a least a portion of his dose daily.     Has not had labs checked in ~ 6 months. I requested that he have these done in the next 2 weeks.   - continue with imatinib 400 mg daily, encouraged trying to getting full dose daily.     # Hypophosphatemia: Long-term phos repletion  - Phos packets renewed.     RTC in 6 months with CHRISTI      Danilo Aldana MD, PhD  Division of Hematology, Oncology, and Transplantation  [facility]

## 2022-06-08 ENCOUNTER — VIRTUAL VISIT (OUTPATIENT)
Dept: ONCOLOGY | Facility: CLINIC | Age: 37
End: 2022-06-08
Attending: INTERNAL MEDICINE
Payer: COMMERCIAL

## 2022-06-08 DIAGNOSIS — C92.10 CML (CHRONIC MYELOCYTIC LEUKEMIA) (H): Primary | ICD-10-CM

## 2022-06-08 DIAGNOSIS — R79.0 LOW SERUM PHOSPHORUS FOR AGE: ICD-10-CM

## 2022-06-08 PROCEDURE — 99214 OFFICE O/P EST MOD 30 MIN: CPT | Mod: 95 | Performed by: INTERNAL MEDICINE

## 2022-06-08 NOTE — LETTER
6/8/2022         RE: Easton Wade  [address]  [place] [address]        Dear Colleague,    Thank you for referring your patient, Easton Wade, to the [facility]. Please see a copy of my visit note below.    Easton is a 36 year old who is being evaluated via a billable video visit.      How would you like to obtain your AVS? MyChart  If the video visit is dropped, the invitation should be resent by: Send to e-mail at: [email or web address]  Will anyone else be joining your video visit? No       Francisca Bourgeois VF    Video Start Time:  Video-Visit Details    Type of service:  Video Visit  Video Start Time: 1250 PM  Video End Time: 115 PM    Originating Location (pt. Location): Home    Distant Location (provider location):  [facility]     Platform used for Video Visit: [facility]  June 5, 2022  DIAGNOSES: CML-CP  TREATMENT: Imatinib    Hem/onc History:   Today is my first day meeting Mr. Wade. He is doing quite well. Works from home as a . Not getting enough sleep, but energy level is stable outside of that. No weight changes, early satiety, or other concerns.      History of Present Illness:     1. Presented in 10/2009 with abdominal fullness, profound fatigue, SOB and found to have splenomegaly, elevated WBC, anemia, and retained platelet count  2. 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 Imatinib started Fall 2009  -- CBC quickly normalized  -- Cytogenetic remission obtained (unclear date)  -- Major molecular response as of 2011  -- Molecular remission noted in 6/2012  4. Transfer of care to [facility] in 10/2012 and has been followed there until 6/2017. Waxing and waning p210 and p190 levels all < 0.006% on the IS. Last checked there on 6/2017:  -- P210 0.001% IS  -- P190: 0.0001%  5. Moved to [place] 6/2017  6.  6/2019 BCR-ABL p190 undetectable; BCR-ABL p210 <0.043%  7. 2/2020 BCR-ABL p190 undetectable; BCR-ABL undetectable  8. 8/2020 BCR-ABL p210: BCR-ABL1/ABL1 ratio of <0.039%; BCR-ABL: p190: undetectable  9. 12/20 BCR-ABL: p190: undetectable  10. 5/21 BCR-ABL1/ABL1 ratio of <0.038%      Medications:       Current Outpatient Medications   Medication Sig     imatinib (GLEEVEC) 100 MG tablet Take 4 tablets (400 mg) by mouth daily Take with a meal and a large glass of water.     imatinib (GLEEVEC) 100 MG tablet Take 4 tablets (400 mg) by mouth daily Take with a meal and a large glass of water.     imatinib (GLEEVEC) 100 MG tablet Take 4 tablets (400 mg) by mouth daily Take with a meal and a large glass of water.     imatinib (GLEEVEC) 100 MG tablet Take 4 tablets (400 mg) by mouth daily Take with a meal and a large glass of water.     melatonin 5 MG CAPS Take 5 mg by mouth as needed     Menaquinone-7 (VITAMIN K2 PO) Take 75 mcg by mouth     Vitamin D, Cholecalciferol, 25 MCG (1000 UT) CAPS Take 2,000 mcg by mouth     potassium & sodium phosphates (NEUTRA-PHOS) 280-160-250 MG Packet Take 1 packet by mouth daily (Patient not taking: Reported on 6/8/2022)     No current facility-administered medications for this visit.          Physical Exam:     Physical Exam conducted via video conference:  General: Appears well, no acute distress.  Seated during interview. Able to ambulate without obvious deficit.  Skin: No lesions or rashes on face.  Head and Neck:  No visible JVP. No masses.  ROM at neck without deficit.   EENT: EOMI. No visible nystagmus. No lid lesions.  Anicteric.  Voice without hoarseness. No gross hearing deficits.   Pulmonary: Respiratory rate ~16.  No audible wheezing or stridor. No cough.  Normal work of breathing.   Heart: Acyanotic.  No visible lower extremity edema.   Abdomen: Nondistended. No visible abnormal veinous patterns.   MS: ROM without deficit in upper and lower extremities  Psych:  Affect is  appropriate.  No psychomotor agitation  Data:     I personally reviewed the following studies:    Recent Labs   Lab Test 12/02/21  1417 05/11/21  1502 12/08/20  1149 08/03/20  1501 02/06/20  1323   WBC 4.6 4.0 5.8 5.5 5.3   NEUTROPHIL 41 43.4 48.6 52.6 46.7   HGB 13.7 13.9 14.6 14.0 14.0    175 162 196 195     Recent Labs   Lab Test 12/02/21  1417 05/11/21  1502 12/08/20  1149 08/03/20  1501 02/06/20  1323    141 137 137 137   POTASSIUM 4.2 4.3 4.7 4.5 4.5   CHLORIDE 107 107 107 105 107   CO2 27 30 28 27 28   ANIONGAP 5 4 2* 6 2*   BUN 12 11 13 13 10   CR 0.75 0.81 0.89 0.82 0.73   JUVENAL 9.2 9.0 8.8 9.2 9.3     Recent Labs   Lab Test 12/02/21 1417 05/11/21  1502 12/08/20  1149 08/03/20  1501 02/06/20  1323 10/18/19  1127 10/08/19  1051   MAG  --  2.2  --   --  2.2  --  2.2   PHOS 1.8* 2.3* 2.1*   < > 2.3*   < > 1.8*    < > = values in this interval not displayed.     Recent Labs   Lab Test 12/02/21 1417 05/11/21  1502 12/08/20  1149   BILITOTAL 0.7 0.5 0.7   ALKPHOS 55 59 67   ALT 33 63 51   AST 20 30 28   ALBUMIN 4.2 4.2 4.3       No results found for this or any previous visit (from the past 24 hour(s)).    Assessment and Plan:     Easton is a 36 year old man with CP CML since 2009 with molecular remission on imatinib. Overall tolerating imatinib well.     1. CML-CP in remission  Currently on imatinib 400 mg daily (splitting to 200mg and 100mg within 30 mins and the last 100 within 30 min-1hr apart due to nausea). Tolerating well. Occasionally missed some of his pills, but does get a least a portion of his dose daily.     Has not had labs checked in ~ 6 months. I requested that he have these done in the next 2 weeks.   - continue with imatinib 400 mg daily, encouraged trying to getting full dose daily.     # Hypophosphatemia: Long-term phos repletion  - Phos packets renewed.     RTC in 6 months with CHRISTI          Again, thank you for allowing me to participate in the care of your patient.       Sincerely,    Danilo Aldana MD

## 2022-07-21 ENCOUNTER — TELEPHONE (OUTPATIENT)
Dept: ONCOLOGY | Facility: CLINIC | Age: 37
End: 2022-07-21

## 2022-07-21 NOTE — TELEPHONE ENCOUNTER
Oral Chemotherapy Monitoring Program     Placed call to patient regarding need for lab appointment. Left message requesting call back. No drug names were mentioned. Will update when response received.     Grace Myhre, PharmD  Hematology/Oncology Clinical Pharmacist  [facility]  [facility]  [phone]

## 2022-08-11 ENCOUNTER — LAB (OUTPATIENT)
Dept: LAB | Facility: CLINIC | Age: 37
End: 2022-08-11
Payer: COMMERCIAL

## 2022-08-11 DIAGNOSIS — C92.10 CML (CHRONIC MYELOCYTIC LEUKEMIA) (H): ICD-10-CM

## 2022-08-11 DIAGNOSIS — R79.0 LOW SERUM PHOSPHORUS FOR AGE: ICD-10-CM

## 2022-08-11 LAB
ALBUMIN SERPL-MCNC: 4 G/DL (ref 3.4–5)
ALP SERPL-CCNC: 76 U/L (ref 40–150)
ALT SERPL W P-5'-P-CCNC: 31 U/L (ref 0–70)
ANION GAP SERPL CALCULATED.3IONS-SCNC: 5 MMOL/L (ref 3–14)
AST SERPL W P-5'-P-CCNC: 22 U/L (ref 0–45)
BASOPHILS # BLD AUTO: 0 10E3/UL (ref 0–0.2)
BASOPHILS NFR BLD AUTO: 0 %
BILIRUB SERPL-MCNC: 0.4 MG/DL (ref 0.2–1.3)
BUN SERPL-MCNC: 17 MG/DL (ref 7–30)
CALCIUM SERPL-MCNC: 8.4 MG/DL (ref 8.5–10.1)
CHLORIDE BLD-SCNC: 108 MMOL/L (ref 94–109)
CO2 SERPL-SCNC: 29 MMOL/L (ref 20–32)
CREAT SERPL-MCNC: 1.01 MG/DL (ref 0.66–1.25)
EOSINOPHIL # BLD AUTO: 0.1 10E3/UL (ref 0–0.7)
EOSINOPHIL NFR BLD AUTO: 2 %
ERYTHROCYTE [DISTWIDTH] IN BLOOD BY AUTOMATED COUNT: 13.6 % (ref 10–15)
GFR SERPL CREATININE-BSD FRML MDRD: >90 ML/MIN/1.73M2
GLUCOSE BLD-MCNC: 90 MG/DL (ref 70–99)
HCT VFR BLD AUTO: 41.8 % (ref 40–53)
HGB BLD-MCNC: 14.3 G/DL (ref 13.3–17.7)
IMM GRANULOCYTES # BLD: 0 10E3/UL
IMM GRANULOCYTES NFR BLD: 0 %
LAB DIRECTOR COMMENTS: NORMAL
LAB DIRECTOR DISCLAIMER: NORMAL
LAB DIRECTOR INTERPRETATION: NORMAL
LAB DIRECTOR METHODOLOGY: NORMAL
LAB DIRECTOR RESULTS: NORMAL
LYMPHOCYTES # BLD AUTO: 2.4 10E3/UL (ref 0.8–5.3)
LYMPHOCYTES NFR BLD AUTO: 46 %
MCH RBC QN AUTO: 31.6 PG (ref 26.5–33)
MCHC RBC AUTO-ENTMCNC: 34.2 G/DL (ref 31.5–36.5)
MCV RBC AUTO: 93 FL (ref 78–100)
MONOCYTES # BLD AUTO: 0.7 10E3/UL (ref 0–1.3)
MONOCYTES NFR BLD AUTO: 12 %
NEUTROPHILS # BLD AUTO: 2.1 10E3/UL (ref 1.6–8.3)
NEUTROPHILS NFR BLD AUTO: 40 %
NRBC # BLD AUTO: 0 10E3/UL
NRBC BLD AUTO-RTO: 0 /100
PHOSPHATE SERPL-MCNC: 2.4 MG/DL (ref 2.5–4.5)
PLATELET # BLD AUTO: 200 10E3/UL (ref 150–450)
POTASSIUM BLD-SCNC: 4.2 MMOL/L (ref 3.4–5.3)
PROT SERPL-MCNC: 7 G/DL (ref 6.8–8.8)
RBC # BLD AUTO: 4.52 10E6/UL (ref 4.4–5.9)
SODIUM SERPL-SCNC: 142 MMOL/L (ref 133–144)
SPECIMEN DESCRIPTION: NORMAL
WBC # BLD AUTO: 5.3 10E3/UL (ref 4–11)

## 2022-08-11 PROCEDURE — 84100 ASSAY OF PHOSPHORUS: CPT | Performed by: PATHOLOGY

## 2022-08-11 PROCEDURE — 81206 BCR/ABL1 GENE MAJOR BP: CPT | Performed by: INTERNAL MEDICINE

## 2022-08-11 PROCEDURE — 36415 COLL VENOUS BLD VENIPUNCTURE: CPT | Performed by: PATHOLOGY

## 2022-08-11 PROCEDURE — 85025 COMPLETE CBC W/AUTO DIFF WBC: CPT | Performed by: PATHOLOGY

## 2022-08-11 PROCEDURE — G0452 MOLECULAR PATHOLOGY INTERPR: HCPCS | Mod: 26 | Performed by: STUDENT IN AN ORGANIZED HEALTH CARE EDUCATION/TRAINING PROGRAM

## 2022-08-11 PROCEDURE — 80053 COMPREHEN METABOLIC PANEL: CPT | Performed by: PATHOLOGY

## 2022-08-28 DIAGNOSIS — C92.10 CML (CHRONIC MYELOCYTIC LEUKEMIA) (H): Primary | ICD-10-CM

## 2022-09-01 DIAGNOSIS — C92.10 CML (CHRONIC MYELOCYTIC LEUKEMIA) (H): Primary | ICD-10-CM

## 2022-09-01 RX ORDER — IMATINIB MESYLATE 100 MG/1
400 TABLET, FILM COATED ORAL DAILY
Qty: 120 TABLET | Refills: 3 | Status: SHIPPED | OUTPATIENT
Start: 2022-09-01 | End: 2024-03-25

## 2022-09-02 ENCOUNTER — TELEPHONE (OUTPATIENT)
Dept: ONCOLOGY | Facility: CLINIC | Age: 37
End: 2022-09-02

## 2022-09-02 NOTE — ORAL ONC MGMT
Oral Chemotherapy Monitoring Program    Subjective/Objective:  Easton Pardo is a 37 year old male contacted by phone for a follow-up visit for oral chemotherapy.  Easton confirms taking Gleevec 400 mg daily. He denies any new or worsening adverse effects, recent medication changes, or missed doses. He is tolerating therapy well and has been on for many years.    ORAL CHEMOTHERAPY 3/3/2022 3/7/2022 3/9/2022 3/11/2022 5/6/2022 7/21/2022 9/2/2022   Assessment Type Left Voicemail Left Voicemail - Quarterly Follow up Refill Left Voicemail Quarterly Follow up   Diagnosis Code Chronic Myeloid Leukemia (CML) Chronic Myeloid Leukemia (CML) Chronic Myeloid Leukemia (CML) Chronic Myeloid Leukemia (CML) Chronic Myeloid Leukemia (CML) Chronic Myeloid Leukemia (CML) Chronic Myeloid Leukemia (CML)   Providers Dr. Katya Aldana   [facility]   Drug Name Gleevec (imatinib) Gleevec (imatinib) Gleevec (imatinib) Gleevec (imatinib) Gleevec (imatinib) Gleevec (imatinib) Gleevec (imatinib)   Dose 400 mg 400 mg 400 mg 400 mg 400 mg 400 mg 400 mg   Current Schedule Daily Daily Daily Daily Daily Daily Daily   Cycle Details Continuous Continuous Continuous Continuous Continuous Continuous Continuous   Start Date of Last Cycle - - - - - - -   Planned next cycle start date - - - 3/18/2022 - - -   Doses missed in last 2 weeks - - - 0 - - 0   Adherence Assessment - - - Adherent - - Adherent   Reason for Non-adherence - - - - - - -   Adherence Intervention Recommended - - - - - - -   Adverse Effects - - - No AE identified during assessment - - No AE identified during assessment   Pharmacist Intervention(nausea) - - - - - - -   Any new drug interactions? - - - No - - No   Is the dose as ordered appropriate for the patient? - - - Yes - - Yes   Has the patient been assessed within the past 6 months for depression? - - - -  "- - -   Has the patient missed any days of school, work, or other routine activity? - - - - - - -       Last PHQ-2 Score on record: No flowsheet data found.    Vitals:  BP:   BP Readings from Last 1 Encounters:   12/02/21 115/78     Wt Readings from Last 1 Encounters:   12/02/21 79.4 kg (175 lb)     Estimated body surface area is 1.95 meters squared as calculated from the following:    Height as of 10/8/19: 1.73 m (5' 8.11\").    Weight as of 12/2/21: 79.4 kg (175 lb).    Labs:  _  Result Component Current Result Ref Range   Sodium 142 (8/11/2022) 133 - 144 mmol/L     _  Result Component Current Result Ref Range   Potassium 4.2 (8/11/2022) 3.4 - 5.3 mmol/L     _  Result Component Current Result Ref Range   Calcium 8.4 (L) (8/11/2022) 8.5 - 10.1 mg/dL     No results found for Mag within last 30 days.     _  Result Component Current Result Ref Range   Phosphorus 2.4 (L) (8/11/2022) 2.5 - 4.5 mg/dL     _  Result Component Current Result Ref Range   Albumin 4.0 (8/11/2022) 3.4 - 5.0 g/dL     _  Result Component Current Result Ref Range   Urea Nitrogen 17 (8/11/2022) 7 - 30 mg/dL     _  Result Component Current Result Ref Range   Creatinine 1.01 (8/11/2022) 0.66 - 1.25 mg/dL     _  Result Component Current Result Ref Range   AST 22 (8/11/2022) 0 - 45 U/L     _  Result Component Current Result Ref Range   ALT 31 (8/11/2022) 0 - 70 U/L     _  Result Component Current Result Ref Range   Bilirubin Total 0.4 (8/11/2022) 0.2 - 1.3 mg/dL     _  Result Component Current Result Ref Range   WBC Count 5.3 (8/11/2022) 4.0 - 11.0 10e3/uL     _  Result Component Current Result Ref Range   Hemoglobin 14.3 (8/11/2022) 13.3 - 17.7 g/dL     _  Result Component Current Result Ref Range   Platelet Count 200 (8/11/2022) 150 - 450 10e3/uL     No results found for ANC within last 30 days.     _  Result Component Current Result Ref Range   Absolute Neutrophils 2.1 (8/11/2022) 1.6 - 8.3 10e3/uL          Assessment/Plan:  Easton is tolerating " Gleevec well. Continue current therapy.    Follow-Up:  12/8: labs and clinic appt    Refill Due:  [facility] will deliver Gleevec 9/7    Jose M LimD, [facility]  Hematology/Oncology Clinical Pharmacist  [facility]  [facility]  [phone]

## 2022-09-18 ENCOUNTER — HEALTH MAINTENANCE LETTER (OUTPATIENT)
Age: 37
End: 2022-09-18

## 2022-12-27 DIAGNOSIS — C92.10 CML (CHRONIC MYELOCYTIC LEUKEMIA) (H): Primary | ICD-10-CM

## 2022-12-30 DIAGNOSIS — C92.10 CML (CHRONIC MYELOCYTIC LEUKEMIA) (H): Primary | ICD-10-CM

## 2022-12-30 RX ORDER — IMATINIB MESYLATE 100 MG/1
400 TABLET, FILM COATED ORAL DAILY
Qty: 120 TABLET | Refills: 3 | Status: SHIPPED | OUTPATIENT
Start: 2022-12-30 | End: 2024-03-25

## 2023-01-14 NOTE — TELEPHONE ENCOUNTER
Oral Chemotherapy Monitoring Program     Placed call to patient in follow up of oral chemotherapy. Left message requesting call back. No drug names were mentioned. Will update when response received.     Corazon Romano, PharmD  Hematology/Oncology Clinical Pharmacist  [facility]  [facility]          
independent/needs device

## 2023-01-20 ENCOUNTER — TELEPHONE (OUTPATIENT)
Dept: ONCOLOGY | Facility: CLINIC | Age: 38
End: 2023-01-20
Payer: COMMERCIAL

## 2023-03-01 ENCOUNTER — TELEPHONE (OUTPATIENT)
Dept: ONCOLOGY | Facility: CLINIC | Age: 38
End: 2023-03-01
Payer: COMMERCIAL

## 2023-03-01 NOTE — TELEPHONE ENCOUNTER
Oral Chemotherapy Monitoring Program     Placed call to patient in follow up of oral chemotherapy. Left message requesting call back. No drug names were mentioned. Will update when response received.     Isabella Artis, Pharmacy Student  [facility]   [phone]

## 2023-03-03 NOTE — TELEPHONE ENCOUNTER
Oral Chemotherapy Monitoring Program     Placed call to patient in follow up of oral chemotherapy. Left message requesting call back. No drug names were mentioned. Will update when response received.     Isabella Artis, Pharmacy Student   [facility]   [phone]

## 2023-03-07 NOTE — TELEPHONE ENCOUNTER
Oral Chemotherapy Monitoring Program     Placed call to patient in follow up of oral chemotherapy. Left message requesting call back. No drug names were mentioned. Will update when response received.     Isabella Artis, Pharmacy Student   [facility]   [phone]

## 2023-03-07 NOTE — TELEPHONE ENCOUNTER
Oral Chemotherapy Monitoring Program    Subjective/Objective:  Easton Pardo is a 37 year old male contacted by phone for a follow-up visit for oral chemotherapy.  Easton confirms taking the appropriate dose of Gleevec 400mg once daily. Easton notes that he takes 200mg, then 30 minutes later takes 100mg and then around 30 minutes later takes 100mg for a total daily dose of 400mg. He notes that this helps reduce nausea/stomach upset. He notes that he very seldom (~once a month) misses the last 100mg of a dose. He reports he has around 12 tablets on hand. Denies recent hospital or ED visits. Patient has not had any recent medication changes.   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   Additionally, patient reports that he is aware that he is due for labs and an oncology visit, but has been working >12 hours a day so he is unsure when he would be available for an appointment. He notes that work is slowing down and will call to schedule soon.     ORAL CHEMOTHERAPY 7/21/2022 9/2/2022 12/30/2022 3/1/2023 3/3/2023 3/7/2023 3/7/2023   Assessment Type Left Voicemail Quarterly Follow up Refill Left Voicemail Left Voicemail Left Voicemail Quarterly Follow up   Diagnosis Code Chronic Myeloid Leukemia (CML) Chronic Myeloid Leukemia (CML) Chronic Myeloid Leukemia (CML) Chronic Myeloid Leukemia (CML) Chronic Myeloid Leukemia (CML) Chronic Myeloid Leukemia (CML) Chronic Myeloid Leukemia (CML)   Providers Dr. Katya Aldana   [facility]   Drug Name Gleevec (imatinib) Gleevec (imatinib) Gleevec (imatinib) Gleevec (imatinib) Gleevec (imatinib) Gleevec (imatinib) Gleevec (imatinib)   Dose 400 mg 400 mg 400 mg 400 mg 400 mg 400 mg 400 mg   Current Schedule  "Daily Daily Daily Daily Daily Daily Daily   Cycle Details Continuous Continuous Continuous Continuous Continuous Continuous Continuous   Start Date of Last Cycle - - - - - - -   Planned next cycle start date - - - - - - -   Doses missed in last 2 weeks - 0 - - - - 1   Adherence Assessment - Adherent - - - - Adherent   Reason for Non-adherence - - - - - - -   Adherence Intervention Recommended - - - - - - -   Adverse Effects - No AE identified during assessment - - - - Other (See Note for Details)   Pharmacist Intervention(nausea) - - - - - - -   Other (See Note for Details) - - - - - - Beau's Lines   Pharmacist intervention(other) - - - - - - No   Any new drug interactions? - No - - - - No   Is the dose as ordered appropriate for the patient? - Yes - - - - Yes   Has the patient been assessed within the past 6 months for depression? - - - - - - -   Has the patient missed any days of school, work, or other routine activity? - - - - - - -   Since the last time we talked, have you been hospitalized or used the emergency room? - - - - - - No       Last PHQ-2 Score on record: No flowsheet data found.    Vitals:  BP:   BP Readings from Last 1 Encounters:   12/02/21 115/78     Wt Readings from Last 1 Encounters:   12/02/21 79.4 kg (175 lb)     Estimated body surface area is 1.95 meters squared as calculated from the following:    Height as of 10/8/19: 1.73 m (5' 8.11\").    Weight as of 12/2/21: 79.4 kg (175 lb).    Labs:  No results found for NA within last 30 days.     No results found for K within last 30 days.     No results found for CA within last 30 days.     No results found for Mag within last 30 days.     No results found for Phos within last 30 days.     No results found for ALBUMIN within last 30 days.     No results found for BUN within last 30 days.     No results found for CR within last 30 days.     No results found for AST within last 30 days.     No results found for ALT within last 30 days.     No results " found for BILITOTAL within last 30 days.     No results found for WBC within last 30 days.     No results found for HGB within last 30 days.     No results found for PLT within last 30 days.     No results found for ANC within last 30 days.     No results found for ANC within last 30 days.        Assessment/Plan:  Easton is tolerating Gleevec well. Continue therapy as planned. Discussed keeping a close eye on Beau's lines and note any changes that occur.   PDC = 1.00, no adherence concerns.     Follow-Up:  Patient endorses that he is going to call to schedule labs + provider appointment soon.     Refill Due:  [facility] to deliver refill on 3/9.    Isabella Artis, Pharmacy Student   [facility]   [phone]

## 2023-03-13 ENCOUNTER — TELEPHONE (OUTPATIENT)
Dept: ONCOLOGY | Facility: CLINIC | Age: 38
End: 2023-03-13
Payer: COMMERCIAL

## 2023-03-13 NOTE — TELEPHONE ENCOUNTER
PA Initiation    Medication: Gleevec PA  Insurance Company: [employer] - Phone [phone] Fax [phone]  Pharmacy Filling the Rx:    Filling Pharmacy Phone:    Filling Pharmacy Fax:    Start Date: 3/13/2023    [id number]    Thank you,    Michelle Griffin  Oncology Pharmacy Liaison II  [email or web address]  Phone: [phone]  Fax: [phone]

## 2023-03-13 NOTE — TELEPHONE ENCOUNTER
Prior Authorization Approval    Authorization Effective Date: 2/11/2023  Authorization Expiration Date: 3/12/2024  Medication: Gleevec PA Approved- SHIN 1  Approved Dose/Quantity: 120 per 30 DS  Reference #: [id number]   Insurance Company: [employer] - Phone [phone] Fax [phone]  Expected CoPay:       CoPay Card Available:      Foundation Assistance Needed:    Which Pharmacy is filling the prescription (Not needed for infusion/clinic administered):    Pharmacy Notified: Yes  Patient Notified: Yes          Thank you,    Michelle Griffin  Oncology Pharmacy Liaison II  [email or web address]  Phone: [phone]  Fax: [phone]

## 2023-04-25 DIAGNOSIS — C92.10 CML (CHRONIC MYELOCYTIC LEUKEMIA) (H): Primary | ICD-10-CM

## 2023-05-01 DIAGNOSIS — C92.10 CML (CHRONIC MYELOCYTIC LEUKEMIA) (H): Primary | ICD-10-CM

## 2023-05-01 RX ORDER — IMATINIB MESYLATE 100 MG/1
400 TABLET, FILM COATED ORAL DAILY
Qty: 120 TABLET | Refills: 3 | Status: SHIPPED | OUTPATIENT
Start: 2023-05-18 | End: 2024-03-25

## 2023-05-12 NOTE — NURSING NOTE
"Oncology Rooming Note    February 6, 2020 1:52 PM   Easton Pardo is a 34 year old male who presents for:    Chief Complaint   Patient presents with     Blood Draw     labs drawn via vpt by rn. vs taken     Oncology Clinic Visit     Pt is here for a rtn for CML     Initial Vitals: Blood Pressure 116/78 (BP Location: Right arm, Patient Position: Sitting, Cuff Size: Adult Regular)   Pulse 75   Temperature 97.5  F (36.4  C) (Oral)   Respiration 16   Weight 80.7 kg (177 lb 14.4 oz)   Oxygen Saturation 99%   Body Mass Index 26.96 kg/m   Estimated body mass index is 26.96 kg/m  as calculated from the following:    Height as of 10/8/19: 1.73 m (5' 8.11\").    Weight as of this encounter: 80.7 kg (177 lb 14.4 oz). Body surface area is 1.97 meters squared.  No Pain (0) Comment: Data Unavailable   No LMP for male patient.  Allergies reviewed: Yes  Medications reviewed: Yes    Medications: Medication refills not needed today.  Pharmacy name entered into ROVOP: [facility] - [place], [place] - [address]    Clinical concerns: none       Yolis Amanda MA            "
Chief Complaint   Patient presents with     Blood Draw     labs drawn via vpt by rn. vs taken     Blood drawn via vpt by RN in lab. VS taken. Pt checked into next appointment.   Rita Lee RN     
No.

## 2023-06-04 ENCOUNTER — HEALTH MAINTENANCE LETTER (OUTPATIENT)
Age: 38
End: 2023-06-04

## 2023-08-16 NOTE — PROGRESS NOTES
[facility]  08/17/2023  DIAGNOSES: CML-CP  TREATMENT: Imatinib    Hem/onc History:   Easton presents to clinic for follow up.  He is overall doing well.  Has been busy with work and working about 12-13 hours a night.  He is using THC gummies for sleep which have help improved his sleep.  Has nausea when taking the medication if he does not have food in his stomach.     Denies fevers/chills,  night sweats, unexplained weight loss, new pains, new lumps/bumps, frequent infections.        History of Present Illness:     1. Presented in 10/2009 with abdominal fullness, profound fatigue, SOB and found to have splenomegaly, elevated WBC, anemia, and retained platelet count  2. 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 Imatinib started Fall 2009  -- CBC quickly normalized  -- Cytogenetic remission obtained (unclear date)  -- Major molecular response as of 2011  -- Molecular remission noted in 6/2012  4. Transfer of care to [facility] in 10/2012 and has been followed there until 6/2017. Waxing and waning p210 and p190 levels all < 0.006% on the IS. Last checked there on 6/2017:  -- P210 0.001% IS  -- P190: 0.0001%  5. Moved to [place] 6/2017 6. 6/2019 BCR-ABL p190 undetectable; BCR-ABL p210 <0.043%  7. 2/2020 BCR-ABL p190 undetectable; BCR-ABL undetectable  8. 8/2020 BCR-ABL p210: BCR-ABL1/ABL1 ratio of <0.039%; BCR-ABL: p190: undetectable  9. 12/20 BCR-ABL: p190: undetectable  10. 5/21 BCR-ABL1/ABL1 ratio of <0.038%      Medications:       Current Outpatient Medications   Medication Sig    imatinib (GLEEVEC) 100 MG tablet Take 4 tablets (400 mg) by mouth daily Take with a meal and a large glass of water.    melatonin 5 MG CAPS Take 5 mg by mouth as needed    Menaquinone-7 (VITAMIN K2 PO) Take 75 mcg by mouth    Vitamin D, Cholecalciferol, 25 MCG (1000 UT) CAPS Take 2,000 mcg by mouth    imatinib (GLEEVEC) 100 MG tablet Take 4 tablets (400 mg) by mouth daily Take with a meal  and a large glass of water. (Patient not taking: Reported on 8/17/2023)    imatinib (GLEEVEC) 100 MG tablet Take 4 tablets (400 mg) by mouth daily Take with a meal and a large glass of water. (Patient not taking: Reported on 8/17/2023)    potassium & sodium phosphates (NEUTRA-PHOS) 280-160-250 MG Packet Take 1 packet by mouth daily (Patient not taking: Reported on 8/17/2023)    potassium & sodium phosphates (NEUTRA-PHOS) 280-160-250 MG Packet Take 1 packet by mouth daily (Patient not taking: Reported on 6/8/2022)     No current facility-administered medications for this visit.      Physical Exam:   General: No acute distress  HEENT: Sclera anicteric. Oral exam deferred  Lymph: No lymphadenopathy in neck or supraclavicular  Heart: Regular, rate, and rhythm  Lungs: Clear to ascultation bilaterally  Abdomen: Positive bowel sounds.   Extremities: no lower extremity edema  Neuro: Cranial nerves grossly intact  Rash: none on exposed skin   Data:     Most Recent 3 CBC's:  Recent Labs   Lab Test 08/17/23  1340 08/11/22  1153 12/02/21  1417   WBC 5.5 5.3 4.6   HGB 14.0 14.3 13.7   MCV 92 93 95    200 180   ANEUTAUTO 2.3 2.1 1.9     Most Recent 3 BMP's:  Recent Labs   Lab Test 08/17/23  1340 08/11/22  1153 12/02/21  1417   * 142 139   POTASSIUM 4.2 4.2 4.2   CHLORIDE 103 108 107   CO2 24 29 27   BUN 8.4 17 12   CR 0.88 1.01 0.75   ANIONGAP 8 5 5   JUVENAL 9.3 8.4* 9.2   * 90 88   PROTTOTAL 6.8 7.0 7.1   ALBUMIN 4.7 4.0 4.2    Most Recent 3 LFT's:  Recent Labs   Lab Test 08/17/23  1340 08/11/22  1153 12/02/21  1417   AST 23 22 20   ALT 16 31 33   ALKPHOS 66 76 55   BILITOTAL 0.6 0.4 0.7    Most Recent 2 TSH and T4:No lab results found.  I reviewed the above labs today.      Assessment and Plan:     Easton is a 37 year old man with CP CML since 2009 with molecular remission on imatinib. Overall tolerating imatinib well.     1. CML-CP in remission  Currently on imatinib 400 mg daily (splitting to 200mg and 100mg  within 30 mins and the last 100 within 30 min-1hr apart if he does not have food in his stomach due to nausea). Tolerating well without significant side effects.  - Continue with imatinib 400 mg daily    # Hypophosphatemia: Long-term phos repletion  - Was previously on phos packets but were stopped  - 8/17 Phos 2.1- will restart phos packets, will take 1 packet every 2-3 days    RTC in 6 months with labs and Dr. Aldana.  Thursdays work best for him.  Discussed doing a virtual visit with Dr. Aldana during his lunch break on a Tuesday with labs a day or two prior.      20 minutes spent on the date of the encounter doing chart review, review of test results, interpretation of tests, patient visit, and documentation     DAVIN Tariq CNP

## 2023-08-17 ENCOUNTER — APPOINTMENT (OUTPATIENT)
Dept: LAB | Facility: CLINIC | Age: 38
End: 2023-08-17
Payer: COMMERCIAL

## 2023-08-17 ENCOUNTER — ONCOLOGY VISIT (OUTPATIENT)
Dept: ONCOLOGY | Facility: CLINIC | Age: 38
End: 2023-08-17
Payer: COMMERCIAL

## 2023-08-17 VITALS
OXYGEN SATURATION: 97 % | BODY MASS INDEX: 28.61 KG/M2 | DIASTOLIC BLOOD PRESSURE: 72 MMHG | RESPIRATION RATE: 18 BRPM | SYSTOLIC BLOOD PRESSURE: 128 MMHG | TEMPERATURE: 97.7 F | HEART RATE: 86 BPM | WEIGHT: 188.8 LBS

## 2023-08-17 DIAGNOSIS — R79.0 LOW SERUM PHOSPHORUS FOR AGE: ICD-10-CM

## 2023-08-17 DIAGNOSIS — C92.10 CML (CHRONIC MYELOCYTIC LEUKEMIA) (H): Primary | ICD-10-CM

## 2023-08-17 LAB
ALBUMIN SERPL BCG-MCNC: 4.7 G/DL (ref 3.5–5.2)
ALP SERPL-CCNC: 66 U/L (ref 40–129)
ALT SERPL W P-5'-P-CCNC: 16 U/L (ref 0–70)
ANION GAP SERPL CALCULATED.3IONS-SCNC: 8 MMOL/L (ref 7–15)
AST SERPL W P-5'-P-CCNC: 23 U/L (ref 0–45)
BASOPHILS # BLD AUTO: 0 10E3/UL (ref 0–0.2)
BASOPHILS NFR BLD AUTO: 1 %
BILIRUB SERPL-MCNC: 0.6 MG/DL
BUN SERPL-MCNC: 8.4 MG/DL (ref 6–20)
CALCIUM SERPL-MCNC: 9.3 MG/DL (ref 8.6–10)
CHLORIDE SERPL-SCNC: 103 MMOL/L (ref 98–107)
CREAT SERPL-MCNC: 0.88 MG/DL (ref 0.67–1.17)
DEPRECATED HCO3 PLAS-SCNC: 24 MMOL/L (ref 22–29)
EOSINOPHIL # BLD AUTO: 0.1 10E3/UL (ref 0–0.7)
EOSINOPHIL NFR BLD AUTO: 1 %
ERYTHROCYTE [DISTWIDTH] IN BLOOD BY AUTOMATED COUNT: 13.5 % (ref 10–15)
GFR SERPL CREATININE-BSD FRML MDRD: >90 ML/MIN/1.73M2
GLUCOSE SERPL-MCNC: 100 MG/DL (ref 70–99)
HCT VFR BLD AUTO: 40.1 % (ref 40–53)
HGB BLD-MCNC: 14 G/DL (ref 13.3–17.7)
IMM GRANULOCYTES # BLD: 0 10E3/UL
IMM GRANULOCYTES NFR BLD: 0 %
LYMPHOCYTES # BLD AUTO: 2.6 10E3/UL (ref 0.8–5.3)
LYMPHOCYTES NFR BLD AUTO: 46 %
MAGNESIUM SERPL-MCNC: 2.1 MG/DL (ref 1.7–2.3)
MCH RBC QN AUTO: 32.2 PG (ref 26.5–33)
MCHC RBC AUTO-ENTMCNC: 34.9 G/DL (ref 31.5–36.5)
MCV RBC AUTO: 92 FL (ref 78–100)
MONOCYTES # BLD AUTO: 0.5 10E3/UL (ref 0–1.3)
MONOCYTES NFR BLD AUTO: 9 %
NEUTROPHILS # BLD AUTO: 2.3 10E3/UL (ref 1.6–8.3)
NEUTROPHILS NFR BLD AUTO: 43 %
NRBC # BLD AUTO: 0 10E3/UL
NRBC BLD AUTO-RTO: 0 /100
PHOSPHATE SERPL-MCNC: 2.1 MG/DL (ref 2.5–4.5)
PLATELET # BLD AUTO: 219 10E3/UL (ref 150–450)
POTASSIUM SERPL-SCNC: 4.2 MMOL/L (ref 3.4–5.3)
PROT SERPL-MCNC: 6.8 G/DL (ref 6.4–8.3)
RBC # BLD AUTO: 4.35 10E6/UL (ref 4.4–5.9)
SODIUM SERPL-SCNC: 135 MMOL/L (ref 136–145)
WBC # BLD AUTO: 5.5 10E3/UL (ref 4–11)

## 2023-08-17 PROCEDURE — 83735 ASSAY OF MAGNESIUM: CPT

## 2023-08-17 PROCEDURE — 36415 COLL VENOUS BLD VENIPUNCTURE: CPT

## 2023-08-17 PROCEDURE — G0463 HOSPITAL OUTPT CLINIC VISIT: HCPCS

## 2023-08-17 PROCEDURE — 85025 COMPLETE CBC W/AUTO DIFF WBC: CPT

## 2023-08-17 PROCEDURE — 80053 COMPREHEN METABOLIC PANEL: CPT

## 2023-08-17 PROCEDURE — 99214 OFFICE O/P EST MOD 30 MIN: CPT

## 2023-08-17 PROCEDURE — 84100 ASSAY OF PHOSPHORUS: CPT

## 2023-08-17 ASSESSMENT — PAIN SCALES - GENERAL: PAINLEVEL: NO PAIN (0)

## 2023-08-17 NOTE — LETTER
8/17/2023         RE: Easton Pardo  [address]  [place] [address]        Dear Colleague,    Thank you for referring your patient, Easton Pardo, to the [facility]. Please see a copy of my visit note below.    [facility]  08/17/2023  DIAGNOSES: CML-CP  TREATMENT: Imatinib    Hem/onc History:   Easton presents to clinic for follow up.  He is overall doing well.  Has been busy with work and working about 12-13 hours a night.  He is using THC gummies for sleep which have help improved his sleep.  Has nausea when taking the medication if he does not have food in his stomach.     Denies fevers/chills,  night sweats, unexplained weight loss, new pains, new lumps/bumps, frequent infections.        History of Present Illness:     1. Presented in 10/2009 with abdominal fullness, profound fatigue, SOB and found to have splenomegaly, elevated WBC, anemia, and retained platelet count  2. 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 Imatinib started Fall 2009  -- CBC quickly normalized  -- Cytogenetic remission obtained (unclear date)  -- Major molecular response as of 2011  -- Molecular remission noted in 6/2012  4. Transfer of care to [facility] in 10/2012 and has been followed there until 6/2017. Waxing and waning p210 and p190 levels all < 0.006% on the IS. Last checked there on 6/2017:  -- P210 0.001% IS  -- P190: 0.0001%  5. Moved to [place] 6/2017 6. 6/2019 BCR-ABL p190 undetectable; BCR-ABL p210 <0.043%  7. 2/2020 BCR-ABL p190 undetectable; BCR-ABL undetectable  8. 8/2020 BCR-ABL p210: BCR-ABL1/ABL1 ratio of <0.039%; BCR-ABL: p190: undetectable  9. 12/20 BCR-ABL: p190: undetectable  10. 5/21 BCR-ABL1/ABL1 ratio of <0.038%      Medications:       Current Outpatient Medications   Medication Sig    imatinib (GLEEVEC) 100 MG tablet Take 4 tablets (400 mg) by mouth daily Take with a meal and a large  glass of water.    melatonin 5 MG CAPS Take 5 mg by mouth as needed    Menaquinone-7 (VITAMIN K2 PO) Take 75 mcg by mouth    Vitamin D, Cholecalciferol, 25 MCG (1000 UT) CAPS Take 2,000 mcg by mouth    imatinib (GLEEVEC) 100 MG tablet Take 4 tablets (400 mg) by mouth daily Take with a meal and a large glass of water. (Patient not taking: Reported on 8/17/2023)    imatinib (GLEEVEC) 100 MG tablet Take 4 tablets (400 mg) by mouth daily Take with a meal and a large glass of water. (Patient not taking: Reported on 8/17/2023)    potassium & sodium phosphates (NEUTRA-PHOS) 280-160-250 MG Packet Take 1 packet by mouth daily (Patient not taking: Reported on 8/17/2023)    potassium & sodium phosphates (NEUTRA-PHOS) 280-160-250 MG Packet Take 1 packet by mouth daily (Patient not taking: Reported on 6/8/2022)     No current facility-administered medications for this visit.      Physical Exam:   General: No acute distress  HEENT: Sclera anicteric. Oral exam deferred  Lymph: No lymphadenopathy in neck or supraclavicular  Heart: Regular, rate, and rhythm  Lungs: Clear to ascultation bilaterally  Abdomen: Positive bowel sounds.   Extremities: no lower extremity edema  Neuro: Cranial nerves grossly intact  Rash: none on exposed skin   Data:     Most Recent 3 CBC's:  Recent Labs   Lab Test 08/17/23  1340 08/11/22  1153 12/02/21  1417   WBC 5.5 5.3 4.6   HGB 14.0 14.3 13.7   MCV 92 93 95    200 180   ANEUTAUTO 2.3 2.1 1.9     Most Recent 3 BMP's:  Recent Labs   Lab Test 08/17/23  1340 08/11/22  1153 12/02/21  1417   * 142 139   POTASSIUM 4.2 4.2 4.2   CHLORIDE 103 108 107   CO2 24 29 27   BUN 8.4 17 12   CR 0.88 1.01 0.75   ANIONGAP 8 5 5   JUVENAL 9.3 8.4* 9.2   * 90 88   PROTTOTAL 6.8 7.0 7.1   ALBUMIN 4.7 4.0 4.2    Most Recent 3 LFT's:  Recent Labs   Lab Test 08/17/23  1340 08/11/22  1153 12/02/21  1417   AST 23 22 20   ALT 16 31 33   ALKPHOS 66 76 55   BILITOTAL 0.6 0.4 0.7    Most Recent 2 TSH and T4:No lab  results found.  I reviewed the above labs today.      Assessment and Plan:     Easton is a 37 year old man with CP CML since 2009 with molecular remission on imatinib. Overall tolerating imatinib well.     1. CML-CP in remission  Currently on imatinib 400 mg daily (splitting to 200mg and 100mg within 30 mins and the last 100 within 30 min-1hr apart if he does not have food in his stomach due to nausea). Tolerating well without significant side effects.  - Continue with imatinib 400 mg daily    # Hypophosphatemia: Long-term phos repletion  - Was previously on phos packets but were stopped  - 8/17 Phos 2.1- will restart phos packets, will take 1 packet every 2-3 days    RTC in 6 months with labs and Dr. Aldana.  Thursdays work best for him.  Discussed doing a virtual visit with Dr. Aldana during his lunch break on a Tuesday with labs a day or two prior.      20 minutes spent on the date of the encounter doing chart review, review of test results, interpretation of tests, patient visit, and documentation     DAVIN Tariq CNP

## 2023-08-17 NOTE — NURSING NOTE
"Oncology Rooming Note    August 17, 2023 1:56 PM   Easton Pardo is a 37 year old male who presents for:    Chief Complaint   Patient presents with    Blood Draw     Labs collected from venipuncture by RN. Vitals taken. Checked in for appointment(s).     Oncology Clinic Visit     CML     Initial Vitals: /72 (BP Location: Right arm, Patient Position: Sitting, Cuff Size: Adult Regular)   Pulse 86   Temp 97.7  F (36.5  C) (Oral)   Resp 18   Wt 85.6 kg (188 lb 12.8 oz)   SpO2 97%   BMI 28.61 kg/m   Estimated body mass index is 28.61 kg/m  as calculated from the following:    Height as of 10/8/19: 1.73 m (5' 8.11\").    Weight as of this encounter: 85.6 kg (188 lb 12.8 oz). Body surface area is 2.03 meters squared.  No Pain (0) Comment: Data Unavailable   No LMP for male patient.  Allergies reviewed: Yes  Medications reviewed: Yes    Medications: Medication refills not needed today.  Pharmacy name entered into [employer]: [facility] - [place], [place] - [address]    Clinical concerns:  Patient states there are no new concerns to discuss with provider.  Erma was not notified.        Lissa Richmond CMA              "

## 2023-08-22 DIAGNOSIS — C92.10 CML (CHRONIC MYELOCYTIC LEUKEMIA) (H): Primary | ICD-10-CM

## 2023-08-28 ENCOUNTER — TELEPHONE (OUTPATIENT)
Dept: ONCOLOGY | Facility: CLINIC | Age: 38
End: 2023-08-28
Payer: COMMERCIAL

## 2023-08-28 DIAGNOSIS — C92.10 CML (CHRONIC MYELOCYTIC LEUKEMIA) (H): Primary | ICD-10-CM

## 2023-08-28 RX ORDER — IMATINIB MESYLATE 100 MG/1
400 TABLET, FILM COATED ORAL DAILY
Qty: 120 TABLET | Refills: 3 | Status: SHIPPED | OUTPATIENT
Start: 2023-08-28 | End: 2024-03-25

## 2023-09-27 ENCOUNTER — TELEPHONE (OUTPATIENT)
Dept: ONCOLOGY | Facility: CLINIC | Age: 38
End: 2023-09-27
Payer: COMMERCIAL

## 2023-09-27 NOTE — TELEPHONE ENCOUNTER
Oral Chemotherapy Monitoring Program    Subjective/Objective:  Easton Pardo is a 38 year old male contacted by phone for a follow-up visit for oral chemotherapy.  Easton confirms taking the appropriate dose of Gleevec 400mg ([id number] tablets) once daily. Denies new or worsening side effects, missed doses, and recent hospital or ED visits. Patient states he has started taking some THC gummies.  I did not find any drug interactions with this and the Gleevec.            3/1/2023     2:00 PM 3/3/2023    10:00 AM 3/7/2023    11:00 AM 3/7/2023    12:00 PM 5/1/2023    10:00 AM 8/28/2023     8:00 AM 9/27/2023     1:00 PM   ORAL CHEMOTHERAPY   Assessment Type Left Voicemail Left Voicemail Left Voicemail Quarterly Follow up Refill Refill Other   Diagnosis Code Chronic Myeloid Leukemia (CML) Chronic Myeloid Leukemia (CML) Chronic Myeloid Leukemia (CML) Chronic Myeloid Leukemia (CML) Chronic Myeloid Leukemia (CML) Chronic Myeloid Leukemia (CML) Chronic Myeloid Leukemia (CML)   Providers Dr Katya Aldana   [facility]   Is this patient followed by the [facility] team?     Yes Yes Yes   Drug Name Gleevec (imatinib) Gleevec (imatinib) Gleevec (imatinib) Gleevec (imatinib) Gleevec (imatinib) Gleevec (imatinib) Gleevec (imatinib)   Dose 400 mg 400 mg 400 mg 400 mg 400 mg 400 mg 400 mg   Current Schedule Daily Daily Daily Daily Daily Daily Daily   Cycle Details Continuous Continuous Continuous Continuous Continuous Continuous Continuous   Doses missed in last 2 weeks    1   0   Adherence Assessment    Adherent   Adherent   Adverse Effects    Other (See Note for Details)   No AE identified during assessment   Other (See Note for Details)    Gloria Humphrey      Pharmacist intervention(other)    No      Any new drug interactions?    No   No   Is the dose as ordered appropriate for the patient?    Yes   Yes   Since the  "last time we talked, have you been hospitalized or used the emergency room?    No   No       Last PHQ-2 Score on record:        No data to display                Vitals:  BP:   BP Readings from Last 1 Encounters:   08/17/23 128/72     Wt Readings from Last 1 Encounters:   08/17/23 85.6 kg (188 lb 12.8 oz)     Estimated body surface area is 2.03 meters squared as calculated from the following:    Height as of 10/8/19: 1.73 m (5' 8.11\").    Weight as of 8/17/23: 85.6 kg (188 lb 12.8 oz).    Labs:  No results found for NA within last 30 days.     No results found for K within last 30 days.     No results found for CA within last 30 days.     No results found for Mag within last 30 days.     No results found for Phos within last 30 days.     No results found for ALBUMIN within last 30 days.     No results found for BUN within last 30 days.     No results found for CR within last 30 days.     No results found for AST within last 30 days.     No results found for ALT within last 30 days.     No results found for BILITOTAL within last 30 days.     No results found for WBC within last 30 days.     No results found for HGB within last 30 days.     No results found for PLT within last 30 days.     No results found for ANC within last 30 days.     No results found for ANC within last 30 days.          Assessment/Plan:  Easton continues to tolerate Gleevec therapy well.  He has committed to taking his medication daily and has been successful the last 2 weeks.  Will continue Gleevec therapy as planned.     Follow-Up:  ~11/18: due for labs  2/21: Appt with Dr. Aldana + labs    Refill Due:  [facility] to deliver medication 9/29/23    Rox Gerardo, PharmD  Hematology/Oncology Clinical Pharmacist  [facility]  [phone]      "

## 2023-12-28 DIAGNOSIS — C92.10 CML (CHRONIC MYELOCYTIC LEUKEMIA) (H): Primary | ICD-10-CM

## 2023-12-28 RX ORDER — IMATINIB MESYLATE 100 MG/1
400 TABLET, FILM COATED ORAL DAILY
Qty: 120 TABLET | Refills: 3 | Status: SHIPPED | OUTPATIENT
Start: 2023-12-28 | End: 2024-03-25

## 2024-02-12 ENCOUNTER — TELEPHONE (OUTPATIENT)
Dept: ONCOLOGY | Facility: CLINIC | Age: 39
End: 2024-02-12
Payer: COMMERCIAL

## 2024-02-12 NOTE — TELEPHONE ENCOUNTER
PA Initiation    Medication: IMATINIB MESYLATE 100 MG PO TABS  Insurance Company: [employer] - Phone [phone] Fax [phone]  Pharmacy Filling the Rx: [facility] - [place], [place] - [address]  Filling Pharmacy Phone:    Filling Pharmacy Fax:    Start Date: 2/12/2024          Thank you,    Michelle Griffin  Oncology Pharmacy Liaison LIS [email or web address]  Phone: [phone]  Fax: [phone]

## 2024-02-13 NOTE — TELEPHONE ENCOUNTER
Faxed form to [phone]    Thank you,    Michelle Griffin  Oncology Pharmacy Liaison II  [email or web address]  Phone: [phone]  Fax: [phone]

## 2024-02-21 DIAGNOSIS — C92.10 CML (CHRONIC MYELOCYTIC LEUKEMIA) (H): Primary | ICD-10-CM

## 2024-02-21 RX ORDER — IMATINIB MESYLATE 100 MG/1
400 TABLET, FILM COATED ORAL DAILY
Qty: 120 TABLET | Refills: 3 | Status: SHIPPED | OUTPATIENT
Start: 2024-02-21 | End: 2024-03-25

## 2024-02-28 NOTE — TELEPHONE ENCOUNTER
The Medwatch form has been faxed to the FDA per Dr. Aldana and the updated information has been faxed to [facility] at [phone].    Thank you,    Michelle Griffin  Oncology Pharmacy Liaison [email or web address] [email or web address]  Phone: [phone]  Fax: [phone]

## 2024-03-04 NOTE — TELEPHONE ENCOUNTER
Medwatch form faxed to [facility] @ [phone].      Thank you,    Jaime Griffin  Oncology Pharmacy Liaison II  [email or web address]  Phone: [phone]  Fax: [phone]

## 2024-03-05 NOTE — TELEPHONE ENCOUNTER
Prior Authorization Approval    Medication: IMATINIB MESYLATE 100 MG PO TABS  Authorization Effective Date: 3/4/2024  Authorization Expiration Date: 3/4/2025  Approved Dose/Quantity: 120 per 30 DS  Reference #: [id number]   Insurance Company: [employer] - Phone [phone] Fax [phone]  Expected CoPay: $ 550  CoPay Card Available: Yes    Financial Assistance Needed:   Which Pharmacy is filling the prescription: [facility] - [place] [address]   Pharmacy Notified: yes   Patient Notified: yes        Thank you,    Michelle Griffin  Oncology Pharmacy Liaison LIS [email or web address]  Phone: [phone]  Fax: [phone]

## 2024-03-05 NOTE — TELEPHONE ENCOUNTER
COPAY CARD OBTAINED    Medication: IMATINIB MESYLATE 100 MG PO TABS  Sponsor: [employer]  Expected Copay: $ 550  Copay card Monthly Max Amount: $    Copay card Annual Amount: $ 30,000        Thank you,    Jaime Griffin  Oncology Pharmacy Liaison II  [email or web address]  Phone: [phone]  Fax: [phone]

## 2024-03-25 DIAGNOSIS — C92.10 CML (CHRONIC MYELOCYTIC LEUKEMIA) (H): Primary | ICD-10-CM

## 2024-03-25 RX ORDER — IMATINIB MESYLATE 100 MG/1
400 TABLET, FILM COATED ORAL DAILY
Qty: 120 TABLET | Refills: 3 | Status: SHIPPED | OUTPATIENT
Start: 2024-03-25 | End: 2024-07-10

## 2024-07-10 DIAGNOSIS — C92.10 CML (CHRONIC MYELOCYTIC LEUKEMIA) (H): Primary | ICD-10-CM

## 2024-07-10 RX ORDER — IMATINIB MESYLATE 100 MG/1
400 TABLET, FILM COATED ORAL DAILY
Qty: 120 TABLET | Refills: 0 | Status: SHIPPED | OUTPATIENT
Start: 2024-07-10

## 2024-07-14 ENCOUNTER — HEALTH MAINTENANCE LETTER (OUTPATIENT)
Age: 39
End: 2024-07-14

## 2024-10-03 ENCOUNTER — TELEPHONE (OUTPATIENT)
Dept: MULTI SPECIALTY CLINIC | Facility: CLINIC | Age: 39
End: 2024-10-03
Payer: COMMERCIAL

## 2024-10-03 NOTE — TELEPHONE ENCOUNTER
I called and reached Easton. He has been able to continue taking Imatinib which was left over from a prior prescription. He is agreeable to labs and a repeat clinic visit in the coming weeks, he has been busy with work and a few family crisis.       Danilo Aldana MD, PhD  Division of Hematology, Oncology, and Transplantation  [facility]

## 2024-10-04 ENCOUNTER — TRANSCRIBE ORDERS (OUTPATIENT)
Dept: MULTI SPECIALTY CLINIC | Facility: CLINIC | Age: 39
End: 2024-10-04
Payer: COMMERCIAL

## 2024-10-04 DIAGNOSIS — C92.10 CML (CHRONIC MYELOCYTIC LEUKEMIA) (H): Primary | ICD-10-CM

## 2024-10-18 ENCOUNTER — PATIENT OUTREACH (OUTPATIENT)
Dept: ONCOLOGY | Facility: CLINIC | Age: 39
End: 2024-10-18
Payer: COMMERCIAL

## 2024-10-18 NOTE — PROGRESS NOTES
[facility]: Cancer Care                                                                                          Message left for Easton asking him to call back so we can coordinate an appointment for him to see Dr. Danilo Mccormack calls back and is able to come in on 10/23/2024 at 1045am with labs prior.     Signature:  María Cheema RN

## 2024-10-22 DIAGNOSIS — C92.10 CML (CHRONIC MYELOCYTIC LEUKEMIA) (H): Primary | ICD-10-CM

## 2024-10-22 NOTE — PROGRESS NOTES
[facility]  Oct 23, 2024  DIAGNOSES: CML-CP  TREATMENT: Imatinib    Hem/onc History:   Mr. Wade returns. He is doing quite well. Works from home as a .  He has been quite busy and coping with several family issues over the past year.  These have prohibit him from coming to clinic, and he also has not seen a physician of any kind over this timeline.  Nonetheless he describes no health complaints today.  Specifically no recent infectious episodes, no night sweats, fatigue or bony pain.  He does not feel any early satiety suggestive of splenomegaly, and has a negative review of systems.    History of Present Illness:     1. Presented in 10/2009 with abdominal fullness, profound fatigue, SOB and found to have splenomegaly, elevated WBC, anemia, and retained platelet count  2. 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 Imatinib started Fall 2009  -- CBC quickly normalized  -- Cytogenetic remission obtained (unclear date)  -- Major molecular response as of 2011  -- Molecular remission noted in 6/2012  4. Transfer of care to [facility] in 10/2012 and has been followed there until 6/2017. Waxing and waning p210 and p190 levels all < 0.006% on the IS. Last checked there on 6/2017:  -- P210 0.001% IS  -- P190: 0.0001%  5. Moved to [place] 6/2017 6. 6/2019 BCR-ABL p190 undetectable; BCR-ABL p210 <0.043%  7. 2/2020 BCR-ABL p190 undetectable; BCR-ABL undetectable  8. 8/2020 BCR-ABL p210: BCR-ABL1/ABL1 ratio of <0.039%; BCR-ABL: p190: undetectable  9. 12/20 BCR-ABL: p190: undetectable  10. 5/21 BCR-ABL1/ABL1 ratio of <0.038%  Medications:       Current Outpatient Medications   Medication Sig Dispense Refill    imatinib (GLEEVEC) 100 MG tablet Take 4 tablets (400 mg) by mouth daily Take with a meal and a large glass of water. 120 tablet 0    melatonin 5 MG CAPS Take 5 mg by mouth as needed      Menaquinone-7 (VITAMIN K2 PO) Take 75 mcg by mouth       "potassium & sodium phosphates (NEUTRA-PHOS) 280-160-250 MG Packet Take 1 packet by mouth daily 90 packet 3    potassium & sodium phosphates (NEUTRA-PHOS) 280-160-250 MG Packet Take 1 packet by mouth daily (Patient not taking: Reported on 8/17/2023) 14 packet 3    Vitamin D, Cholecalciferol, 25 MCG (1000 UT) CAPS Take 2,000 mcg by mouth       No current facility-administered medications for this visit.      Physical Exam:     /84 (BP Location: Left arm, Patient Position: Chair, Cuff Size: Adult Regular)   Pulse 81   Temp 97.9  F (36.6  C) (Oral)   Resp 20   Ht 1.727 m (5' 8\")   Wt 90.7 kg (200 lb)   SpO2 96%   BMI 30.41 kg/m    General: NAD.  CV: RRR.  Pulm: CTAB.  Abdomen: Soft, NT, ND, no HSM.  No lymphadenopathy.    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  Interestingly, his diagnosis was characterized by both P210 and P190 BCR ABL rearrangements.  Experienced a brisk MMR, which has been maintained on imatinib for many years.  He is overall quite compliant with imatinib, but is quite busy and frequently is unable to come in for health care visits.  Labs today are stable, and both BCR-ABL isoform QPCR's are currently pending.     Currently on imatinib 400 mg daily (splitting to 200mg and 100mg within 30 mins and the last 100 within 30 min-1hr apart due to nausea). Tolerating well.     PLAN:  --Check CBC, CMP, BCR-ABL (BOTH Major and Minor isoforms) every 6 months  --RTC every 12 months  --COVID-19 2024 booster and flu vaccine given today    Danilo Aldana MD, PhD  Division of Hematology, Oncology, and Transplantation  [facility]      "

## 2024-10-23 ENCOUNTER — LAB (OUTPATIENT)
Dept: LAB | Facility: CLINIC | Age: 39
End: 2024-10-23
Payer: COMMERCIAL

## 2024-10-23 ENCOUNTER — PATIENT OUTREACH (OUTPATIENT)
Dept: ONCOLOGY | Facility: CLINIC | Age: 39
End: 2024-10-23

## 2024-10-23 ENCOUNTER — ONCOLOGY VISIT (OUTPATIENT)
Dept: ONCOLOGY | Facility: CLINIC | Age: 39
End: 2024-10-23
Attending: INTERNAL MEDICINE
Payer: COMMERCIAL

## 2024-10-23 VITALS
HEIGHT: 68 IN | RESPIRATION RATE: 20 BRPM | OXYGEN SATURATION: 96 % | WEIGHT: 200 LBS | SYSTOLIC BLOOD PRESSURE: 122 MMHG | BODY MASS INDEX: 30.31 KG/M2 | TEMPERATURE: 97.9 F | DIASTOLIC BLOOD PRESSURE: 84 MMHG | HEART RATE: 81 BPM

## 2024-10-23 DIAGNOSIS — C92.10 CML (CHRONIC MYELOCYTIC LEUKEMIA) (H): Primary | ICD-10-CM

## 2024-10-23 DIAGNOSIS — Z23 NEED FOR PROPHYLACTIC VACCINATION AND INOCULATION AGAINST INFLUENZA: ICD-10-CM

## 2024-10-23 DIAGNOSIS — C92.10 CML (CHRONIC MYELOCYTIC LEUKEMIA) (H): ICD-10-CM

## 2024-10-23 LAB
ALBUMIN SERPL BCG-MCNC: 4.5 G/DL (ref 3.5–5.2)
ALP SERPL-CCNC: 83 U/L (ref 40–150)
ALT SERPL W P-5'-P-CCNC: 21 U/L (ref 0–70)
ANION GAP SERPL CALCULATED.3IONS-SCNC: 8 MMOL/L (ref 7–15)
AST SERPL W P-5'-P-CCNC: 23 U/L (ref 0–45)
BASOPHILS # BLD AUTO: 0 10E3/UL (ref 0–0.2)
BASOPHILS NFR BLD AUTO: 1 %
BILIRUB SERPL-MCNC: 0.3 MG/DL
BUN SERPL-MCNC: 9.8 MG/DL (ref 6–20)
CALCIUM SERPL-MCNC: 9.1 MG/DL (ref 8.8–10.4)
CHLORIDE SERPL-SCNC: 105 MMOL/L (ref 98–107)
CREAT SERPL-MCNC: 0.79 MG/DL (ref 0.67–1.17)
EGFRCR SERPLBLD CKD-EPI 2021: >90 ML/MIN/1.73M2
EOSINOPHIL # BLD AUTO: 0.1 10E3/UL (ref 0–0.7)
EOSINOPHIL NFR BLD AUTO: 2 %
ERYTHROCYTE [DISTWIDTH] IN BLOOD BY AUTOMATED COUNT: 13.2 % (ref 10–15)
GLUCOSE SERPL-MCNC: 99 MG/DL (ref 70–99)
HCO3 SERPL-SCNC: 25 MMOL/L (ref 22–29)
HCT VFR BLD AUTO: 40.2 % (ref 40–53)
HGB BLD-MCNC: 14.2 G/DL (ref 13.3–17.7)
IMM GRANULOCYTES # BLD: 0 10E3/UL
IMM GRANULOCYTES NFR BLD: 0 %
LAB DIRECTOR COMMENTS: NORMAL
LAB DIRECTOR COMMENTS: NORMAL
LAB DIRECTOR DISCLAIMER: NORMAL
LAB DIRECTOR DISCLAIMER: NORMAL
LAB DIRECTOR INTERPRETATION: NORMAL
LAB DIRECTOR INTERPRETATION: NORMAL
LAB DIRECTOR METHODOLOGY: NORMAL
LAB DIRECTOR METHODOLOGY: NORMAL
LAB DIRECTOR RESULTS: NORMAL
LAB DIRECTOR RESULTS: NORMAL
LYMPHOCYTES # BLD AUTO: 2.5 10E3/UL (ref 0.8–5.3)
LYMPHOCYTES NFR BLD AUTO: 40 %
MAGNESIUM SERPL-MCNC: 2 MG/DL (ref 1.7–2.3)
MCH RBC QN AUTO: 32.4 PG (ref 26.5–33)
MCHC RBC AUTO-ENTMCNC: 35.3 G/DL (ref 31.5–36.5)
MCV RBC AUTO: 92 FL (ref 78–100)
MONOCYTES # BLD AUTO: 0.7 10E3/UL (ref 0–1.3)
MONOCYTES NFR BLD AUTO: 12 %
NEUTROPHILS # BLD AUTO: 2.8 10E3/UL (ref 1.6–8.3)
NEUTROPHILS NFR BLD AUTO: 45 %
NRBC # BLD AUTO: 0 10E3/UL
NRBC BLD AUTO-RTO: 0 /100
PHOSPHATE SERPL-MCNC: 2.6 MG/DL (ref 2.5–4.5)
PLATELET # BLD AUTO: 211 10E3/UL (ref 150–450)
POTASSIUM SERPL-SCNC: 4.4 MMOL/L (ref 3.4–5.3)
PROT SERPL-MCNC: 6.8 G/DL (ref 6.4–8.3)
RBC # BLD AUTO: 4.38 10E6/UL (ref 4.4–5.9)
SODIUM SERPL-SCNC: 138 MMOL/L (ref 135–145)
SPECIMEN DESCRIPTION: NORMAL
SPECIMEN DESCRIPTION: NORMAL
WBC # BLD AUTO: 6.3 10E3/UL (ref 4–11)

## 2024-10-23 PROCEDURE — G0463 HOSPITAL OUTPT CLINIC VISIT: HCPCS | Performed by: INTERNAL MEDICINE

## 2024-10-23 PROCEDURE — G0452 MOLECULAR PATHOLOGY INTERPR: HCPCS | Mod: 26 | Performed by: PATHOLOGY

## 2024-10-23 PROCEDURE — 250N000011 HC RX IP 250 OP 636: Performed by: INTERNAL MEDICINE

## 2024-10-23 PROCEDURE — 36415 COLL VENOUS BLD VENIPUNCTURE: CPT | Performed by: PATHOLOGY

## 2024-10-23 PROCEDURE — 84100 ASSAY OF PHOSPHORUS: CPT | Performed by: PATHOLOGY

## 2024-10-23 PROCEDURE — 81207 BCR/ABL1 GENE MINOR BP: CPT | Performed by: INTERNAL MEDICINE

## 2024-10-23 PROCEDURE — 91320 SARSCV2 VAC 30MCG TRS-SUC IM: CPT | Performed by: INTERNAL MEDICINE

## 2024-10-23 PROCEDURE — 83735 ASSAY OF MAGNESIUM: CPT | Performed by: PATHOLOGY

## 2024-10-23 PROCEDURE — G0452 MOLECULAR PATHOLOGY INTERPR: HCPCS | Mod: 26 | Performed by: STUDENT IN AN ORGANIZED HEALTH CARE EDUCATION/TRAINING PROGRAM

## 2024-10-23 PROCEDURE — 81206 BCR/ABL1 GENE MAJOR BP: CPT | Performed by: INTERNAL MEDICINE

## 2024-10-23 PROCEDURE — 99214 OFFICE O/P EST MOD 30 MIN: CPT | Performed by: INTERNAL MEDICINE

## 2024-10-23 PROCEDURE — G0008 ADMIN INFLUENZA VIRUS VAC: HCPCS | Performed by: INTERNAL MEDICINE

## 2024-10-23 PROCEDURE — 90656 IIV3 VACC NO PRSV 0.5 ML IM: CPT | Performed by: INTERNAL MEDICINE

## 2024-10-23 PROCEDURE — 36415 COLL VENOUS BLD VENIPUNCTURE: CPT | Performed by: INTERNAL MEDICINE

## 2024-10-23 PROCEDURE — 90480 ADMN SARSCOV2 VAC 1/ONLY CMP: CPT | Performed by: INTERNAL MEDICINE

## 2024-10-23 PROCEDURE — G2211 COMPLEX E/M VISIT ADD ON: HCPCS | Performed by: INTERNAL MEDICINE

## 2024-10-23 PROCEDURE — 85025 COMPLETE CBC W/AUTO DIFF WBC: CPT | Performed by: PATHOLOGY

## 2024-10-23 PROCEDURE — 80053 COMPREHEN METABOLIC PANEL: CPT | Performed by: PATHOLOGY

## 2024-10-23 RX ORDER — IMATINIB MESYLATE 100 MG/1
400 TABLET, FILM COATED ORAL DAILY
Qty: 120 TABLET | Refills: 2 | Status: SHIPPED | OUTPATIENT
Start: 2024-10-23

## 2024-10-23 RX ADMIN — COVID-19 VACCINE, MRNA 30 MCG: 0.04 INJECTION, SUSPENSION INTRAMUSCULAR at 11:44

## 2024-10-23 RX ADMIN — INFLUENZA A VIRUS A/VICTORIA/4897/2022 IVR-238 (H1N1) ANTIGEN (FORMALDEHYDE INACTIVATED), INFLUENZA A VIRUS A/CALIFORNIA/122/2022 SAN-022 (H3N2) ANTIGEN (FORMALDEHYDE INACTIVATED), AND INFLUENZA B VIRUS B/MICHIGAN/01/2021 ANTIGEN (FORMALDEHYDE INACTIVATED) 0.5 ML: 15; 15; 15 INJECTION, SUSPENSION INTRAMUSCULAR at 11:47

## 2024-10-23 ASSESSMENT — PAIN SCALES - GENERAL: PAINLEVEL_OUTOF10: NO PAIN (0)

## 2024-10-23 NOTE — NURSING NOTE
Administrations This Visit       COVID-19 mRNA vaccine 12+y (COMIRNATY (PFIZER_) injection 30 mcg       Admin Date  10/23/2024 Action  $Given Dose  30 mcg Route  Intramuscular Documented By  Robin Zapata RN              influenza trivalent vaccine for ages 6 months to 49 years (PF) (FLUZONE) injection 0.5 mL       Admin Date  10/23/2024 Action  $Given Dose  0.5 mL Route  Intramuscular Documented By  Robin Zapata, KRISTEN                  Pt consents to the above vaccines. Pt observed for 15 minutes post. Pt dismissed in stable condition

## 2024-10-23 NOTE — LETTER
10/23/2024      Easton Wade  [address]  [place] [address]      Dear Colleague,    Thank you for referring your patient, Easton Wade, to the [facility]. Please see a copy of my visit note below.    [facility]  Oct 23, 2024  DIAGNOSES: CML-CP  TREATMENT: Imatinib    Hem/onc History:   Mr. Wade returns. He is doing quite well. Works from home as a .  He has been quite busy and coping with several family issues over the past year.  These have prohibit him from coming to clinic, and he also has not seen a physician of any kind over this timeline.  Nonetheless he describes no health complaints today.  Specifically no recent infectious episodes, no night sweats, fatigue or bony pain.  He does not feel any early satiety suggestive of splenomegaly, and has a negative review of systems.    History of Present Illness:     1. Presented in 10/2009 with abdominal fullness, profound fatigue, SOB and found to have splenomegaly, elevated WBC, anemia, and retained platelet count  2. 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 Imatinib started Fall 2009  -- CBC quickly normalized  -- Cytogenetic remission obtained (unclear date)  -- Major molecular response as of 2011  -- Molecular remission noted in 6/2012  4. Transfer of care to [facility] in 10/2012 and has been followed there until 6/2017. Waxing and waning p210 and p190 levels all < 0.006% on the IS. Last checked there on 6/2017:  -- P210 0.001% IS  -- P190: 0.0001%  5. Moved to [place] 6/2017  6. 6/2019 BCR-ABL p190 undetectable; BCR-ABL p210 <0.043%  7. 2/2020 BCR-ABL p190 undetectable; BCR-ABL undetectable  8. 8/2020 BCR-ABL p210: BCR-ABL1/ABL1 ratio of <0.039%; BCR-ABL: p190: undetectable  9. 12/20 BCR-ABL: p190: undetectable  10. 5/21 BCR-ABL1/ABL1 ratio of <0.038%  Medications:       Current Outpatient Medications  "  Medication Sig Dispense Refill     imatinib (GLEEVEC) 100 MG tablet Take 4 tablets (400 mg) by mouth daily Take with a meal and a large glass of water. 120 tablet 0     melatonin 5 MG CAPS Take 5 mg by mouth as needed       Menaquinone-7 (VITAMIN K2 PO) Take 75 mcg by mouth       potassium & sodium phosphates (NEUTRA-PHOS) 280-160-250 MG Packet Take 1 packet by mouth daily 90 packet 3     potassium & sodium phosphates (NEUTRA-PHOS) 280-160-250 MG Packet Take 1 packet by mouth daily (Patient not taking: Reported on 8/17/2023) 14 packet 3     Vitamin D, Cholecalciferol, 25 MCG (1000 UT) CAPS Take 2,000 mcg by mouth       No current facility-administered medications for this visit.      Physical Exam:     /84 (BP Location: Left arm, Patient Position: Chair, Cuff Size: Adult Regular)   Pulse 81   Temp 97.9  F (36.6  C) (Oral)   Resp 20   Ht 1.727 m (5' 8\")   Wt 90.7 kg (200 lb)   SpO2 96%   BMI 30.41 kg/m    General: NAD.  CV: RRR.  Pulm: CTAB.  Abdomen: Soft, NT, ND, no HSM.  No lymphadenopathy.    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  Interestingly, his diagnosis was characterized by both P210 and P190 BCR ABL rearrangements.  Experienced a brisk MMR, which has been maintained on imatinib for many years.  He is overall quite compliant with imatinib, but is quite busy and frequently is unable to come in for health care visits.  Labs today are stable, and both BCR-ABL isoform QPCR's are currently pending.     Currently on imatinib 400 mg daily (splitting to 200mg and 100mg within 30 mins and the last 100 within 30 min-1hr apart due to nausea). Tolerating well.     PLAN:  --Check CBC, CMP, BCR-ABL (BOTH Major and Minor isoforms) every 6 months  --RTC every 12 months  --COVID-19 2024 " booster and flu vaccine given today    Danilo Aldana MD, PhD  Division of Hematology, Oncology, and Transplantation  [facility]        Again, thank you for allowing me to participate in the care of your patient.        Sincerely,        Danilo Aldana MD

## 2024-10-23 NOTE — NURSING NOTE
"Oncology Rooming Note    October 23, 2024 10:52 AM   Easton Pardo is a 39 year old male who presents for:    Chief Complaint   Patient presents with    Oncology Clinic Visit     Chronic myelocytic leukemia     Initial Vitals: /84 (BP Location: Left arm, Patient Position: Chair, Cuff Size: Adult Regular)   Pulse 81   Temp 97.9  F (36.6  C) (Oral)   Resp 20   Ht 1.727 m (5' 8\")   Wt 90.7 kg (200 lb)   SpO2 96%   BMI 30.41 kg/m   Estimated body mass index is 30.41 kg/m  as calculated from the following:    Height as of this encounter: 1.727 m (5' 8\").    Weight as of this encounter: 90.7 kg (200 lb). Body surface area is 2.09 meters squared.  No Pain (0) Comment: Data Unavailable   No LMP for male patient.  Allergies reviewed: Yes  Medications reviewed: Yes    Medications: Medication refills not needed today.  Pharmacy name entered into [employer]:    [facility] - [place], [place] - [address]  [facility] - [place], [place] - [address] [place] [address] [address]    Frailty Screening:   Is the patient here for a new oncology consult visit in cancer care? 2. No      Clinical concerns: none      Eran Walsh LPN              "

## 2024-10-29 NOTE — PROGRESS NOTES
[facility]: Cancer Care                                                                                          I met with Easton following his visit with Dr Danilo Aldana.  We reviewed the plan for labs today.  Easton confirms that the best way to reach him is by telephone.  He will get labs every 6 months and see Dr. Aldana in 1 year.    Signature:  María Cheema RN

## 2025-02-03 ENCOUNTER — TELEPHONE (OUTPATIENT)
Dept: ONCOLOGY | Facility: CLINIC | Age: 40
End: 2025-02-03
Payer: COMMERCIAL

## 2025-02-03 NOTE — TELEPHONE ENCOUNTER
PA Initiation- Formulary Exception     Medication: IMATINIB MESYLATE 100 MG PO TABS  Ins[facility] Company: [facility] - Phone [phone] Fax [phone]  Pharmacy Filling the Rx: [facility] - [place], [place] - [address]  Filling Pharmacy Phone:    Filling Pharmacy Fax:    Start Date: 2/3/2025    FE faxed along with MedWatch form from 2024 to [phone]                Thank you,    Jaime Griffin  Oncology Pharmacy Liaison II  [email or web address]  Phone: [phone]  Fax: [phone]

## 2025-02-04 NOTE — TELEPHONE ENCOUNTER
Prior Authorization Approval    Medication: IMATINIB MESYLATE 100 MG PO TABS  Authorization Effective Date: 2/3/2025  Authorization Expiration Date: 2/3/2026  Approved Dose/Quantity: 120 per 30 DS  Reference #:     Insurance Company: [employer] - Phone [phone] Fax [phone]  Expected CoPay: $    CoPay Card Available:      Financial Assistance Needed:   Which Pharmacy is filling the prescription: [facility] - [place] [id number]   Pharmacy Notified:   Patient Notified: yes          Thank you,    Michelle Griffin  Oncology Pharmacy Liaison II  [email or web address]  Phone: [phone]  [email or web address]: [phone]

## 2025-02-10 DIAGNOSIS — C92.10 CML (CHRONIC MYELOCYTIC LEUKEMIA) (H): Primary | ICD-10-CM

## 2025-02-13 DIAGNOSIS — C92.10 CML (CHRONIC MYELOCYTIC LEUKEMIA) (H): Primary | ICD-10-CM

## 2025-02-13 RX ORDER — IMATINIB MESYLATE 100 MG/1
400 TABLET, FILM COATED ORAL DAILY
Qty: 120 TABLET | Refills: 0 | Status: SHIPPED | OUTPATIENT
Start: 2025-02-13

## 2025-03-11 DIAGNOSIS — C92.10 CML (CHRONIC MYELOCYTIC LEUKEMIA) (H): Primary | ICD-10-CM

## 2025-04-07 DIAGNOSIS — C92.10 CML (CHRONIC MYELOCYTIC LEUKEMIA) (H): Primary | ICD-10-CM

## 2025-04-16 DIAGNOSIS — C92.10 CML (CHRONIC MYELOCYTIC LEUKEMIA) (H): ICD-10-CM

## 2025-04-16 RX ORDER — IMATINIB MESYLATE 100 MG/1
400 TABLET, FILM COATED ORAL DAILY
Qty: 120 TABLET | Refills: 0 | Status: SHIPPED | OUTPATIENT
Start: 2025-04-16

## 2025-05-12 DIAGNOSIS — C92.10 CML (CHRONIC MYELOCYTIC LEUKEMIA) (H): Primary | ICD-10-CM

## 2025-05-12 RX ORDER — IMATINIB MESYLATE 100 MG/1
400 TABLET, FILM COATED ORAL DAILY
Qty: 120 TABLET | Refills: 0 | Status: SHIPPED | OUTPATIENT
Start: 2025-05-12

## 2025-06-09 DIAGNOSIS — C92.10 CML (CHRONIC MYELOCYTIC LEUKEMIA) (H): Primary | ICD-10-CM

## 2025-06-12 DIAGNOSIS — C92.10 CML (CHRONIC MYELOCYTIC LEUKEMIA) (H): Primary | ICD-10-CM

## 2025-06-12 RX ORDER — IMATINIB MESYLATE 100 MG/1
400 TABLET, FILM COATED ORAL DAILY
Qty: 120 TABLET | Refills: 0 | Status: SHIPPED | OUTPATIENT
Start: 2025-06-12

## 2025-07-06 DIAGNOSIS — C92.10 CML (CHRONIC MYELOCYTIC LEUKEMIA) (H): Primary | ICD-10-CM

## 2025-07-14 DIAGNOSIS — C92.10 CML (CHRONIC MYELOCYTIC LEUKEMIA) (H): Primary | ICD-10-CM

## 2025-07-14 RX ORDER — IMATINIB MESYLATE 100 MG/1
400 TABLET, FILM COATED ORAL DAILY
Qty: 120 TABLET | Refills: 0 | Status: SHIPPED | OUTPATIENT
Start: 2025-07-14

## 2025-07-19 ENCOUNTER — HEALTH MAINTENANCE LETTER (OUTPATIENT)
Age: 40
End: 2025-07-19

## 2025-08-04 DIAGNOSIS — C92.10 CML (CHRONIC MYELOCYTIC LEUKEMIA) (H): Primary | ICD-10-CM

## 2025-08-11 DIAGNOSIS — C92.10 CML (CHRONIC MYELOCYTIC LEUKEMIA) (H): Primary | ICD-10-CM

## 2025-08-11 RX ORDER — IMATINIB MESYLATE 100 MG/1
400 TABLET, FILM COATED ORAL DAILY
Qty: 120 TABLET | Refills: 0 | Status: SHIPPED | OUTPATIENT
Start: 2025-08-11